# Patient Record
Sex: FEMALE | Race: WHITE
[De-identification: names, ages, dates, MRNs, and addresses within clinical notes are randomized per-mention and may not be internally consistent; named-entity substitution may affect disease eponyms.]

---

## 2018-04-23 ENCOUNTER — HOSPITAL ENCOUNTER (INPATIENT)
Dept: HOSPITAL 38 - CC.MS | Age: 83
LOS: 4 days | Discharge: NURSING, REHAB OR LONG-TERM CARE | DRG: 552 | End: 2018-04-27
Attending: FAMILY MEDICINE | Admitting: FAMILY MEDICINE
Payer: MEDICARE

## 2018-04-23 DIAGNOSIS — I25.10: ICD-10-CM

## 2018-04-23 DIAGNOSIS — Z79.82: ICD-10-CM

## 2018-04-23 DIAGNOSIS — S32.039A: ICD-10-CM

## 2018-04-23 DIAGNOSIS — Z79.01: ICD-10-CM

## 2018-04-23 DIAGNOSIS — Z88.8: ICD-10-CM

## 2018-04-23 DIAGNOSIS — S22.070A: ICD-10-CM

## 2018-04-23 DIAGNOSIS — M81.0: ICD-10-CM

## 2018-04-23 DIAGNOSIS — E78.5: ICD-10-CM

## 2018-04-23 DIAGNOSIS — Z96.659: ICD-10-CM

## 2018-04-23 DIAGNOSIS — I10: ICD-10-CM

## 2018-04-23 DIAGNOSIS — S22.080A: ICD-10-CM

## 2018-04-23 DIAGNOSIS — N32.81: ICD-10-CM

## 2018-04-23 DIAGNOSIS — Z79.02: ICD-10-CM

## 2018-04-23 DIAGNOSIS — M54.9: ICD-10-CM

## 2018-04-23 DIAGNOSIS — K21.9: ICD-10-CM

## 2018-04-23 DIAGNOSIS — D47.2: ICD-10-CM

## 2018-04-23 DIAGNOSIS — S32.030A: ICD-10-CM

## 2018-04-23 DIAGNOSIS — Z79.899: ICD-10-CM

## 2018-04-23 DIAGNOSIS — M15.9: ICD-10-CM
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
## 2018-04-23 DIAGNOSIS — E03.9: ICD-10-CM

## 2018-04-23 DIAGNOSIS — S32.020A: ICD-10-CM

## 2018-04-23 DIAGNOSIS — S32.029A: Primary | ICD-10-CM

## 2018-04-23 LAB
CHLORIDE SERPL-SCNC: 98 MEQ/L (ref 98–106)
SODIUM SERPL-SCNC: 138 MEQ/L (ref 136–145)

## 2018-04-23 PROCEDURE — G0378 HOSPITAL OBSERVATION PER HR: HCPCS

## 2018-04-23 PROCEDURE — C9113 INJ PANTOPRAZOLE SODIUM, VIA: HCPCS

## 2018-04-23 RX ADMIN — HYDROCODONE BITATRATE AND ACETAMINOPHEN PRN TAB: 5; 325 TABLET ORAL at 21:13

## 2018-04-24 RX ADMIN — FENTANYL CITRATE SCH MCG: 50 INJECTION, SOLUTION INTRAMUSCULAR; INTRAVENOUS at 11:50

## 2018-04-24 RX ADMIN — HYDROCODONE BITATRATE AND ACETAMINOPHEN PRN TAB: 5; 325 TABLET ORAL at 20:25

## 2018-04-24 RX ADMIN — HYDROCODONE BITATRATE AND ACETAMINOPHEN PRN TAB: 5; 325 TABLET ORAL at 14:08

## 2018-04-24 RX ADMIN — FENTANYL CITRATE SCH MCG: 50 INJECTION, SOLUTION INTRAMUSCULAR; INTRAVENOUS at 18:06

## 2018-04-24 RX ADMIN — HYDROCODONE BITATRATE AND ACETAMINOPHEN PRN TAB: 5; 325 TABLET ORAL at 08:58

## 2018-04-24 NOTE — PCM.PN
- General Info


Date of Service: 04/24/18


Admission Dx/Problem (Free Text): 





Back Pain due to compression fracture of L2 vertebrae


Functional Status: Reports: Pain Controlled, Tolerating Diet.  Denies: 

Ambulating





- Review of Systems


General: Reports: Weakness, Fatigue


HEENT: Reports: No Symptoms


Pulmonary: Reports: No Symptoms


Cardiovascular: Reports: No Symptoms


Gastrointestinal: Reports: No Symptoms


Musculoskeletal: Reports: Back Pain


Skin: Reports: No Symptoms


Neurological: Reports: Weakness


Psychiatric: Reports: No Symptoms





- Patient Data


Vitals - Most Recent: 


 Last Vital Signs











Temp  97.4 F   04/24/18 20:00


 


Pulse  78   04/24/18 20:00


 


Resp  16   04/24/18 20:00


 


BP  129/54 L  04/24/18 20:00


 


Pulse Ox  95   04/24/18 20:00











Weight - Most Recent: 226 lb


Med Orders - Current: 


 Current Medications





Hydrocodone Bitart/Acetaminophen (Norco 325-5 Mg)  1 - 2 tab PO Q4H PRN


   PRN Reason: Pain (moderate 4-6)


   Last Admin: 04/24/18 20:25 Dose:  2 tab


Clopidogrel Bisulfate (Plavix)  75 mg PO DAILY [facility]


   Last Admin: 04/24/18 10:12 Dose:  75 mg


Cyclobenzaprine HCl (Flexeril)  5 mg PO BEDTIME PRN


   PRN Reason: Muscle Spasm


Enoxaparin Sodium (Lovenox)  30 mg SUBCUT Q24H [facility]


   Last Admin: 04/24/18 14:07 Dose:  30 mg


Fentanyl (Sublimaze)  50 mcg IVPUSH Q8H [facility]


   Last Admin: 04/24/18 18:06 Dose:  50 mcg


Fentanyl (Sublimaze)  25 - 50 mcg IVPUSH Q6H PRN


   PRN Reason: Pain


Furosemide (Lasix)  40 mg PO DAILY [facility]


   Last Admin: 04/24/18 10:13 Dose:  40 mg


Isosorbide Mononitrate (Imdur)  30 mg PO DAILY [facility]


   Last Admin: 04/24/18 10:12 Dose:  30 mg


Lisinopril (Prinivil)  20 mg PO DAILY [facility]


   Last Admin: 04/24/18 10:11 Dose:  20 mg


Metoprolol Tartrate (Lopressor)  25 mg PO DAILY [facility]


   Last Admin: 04/24/18 10:13 Dose:  25 mg


Ondansetron HCl (Zofran Odt)  4 mg PO Q4H PRN


   PRN Reason: nausea, able to take PO


Ondansetron HCl (Zofran)  8 mg IV Q6H PRN


   PRN Reason: Nausea/Vomiting


Oxybutynin Chloride (Oxybutynin)  5 mg PO TID [facility]


   Last Admin: 04/24/18 20:21 Dose:  5 mg


Pantoprazole Sodium (Protonix***)  40 mg PO DAILY [facility]


   Last Admin: 04/24/18 10:13 Dose:  40 mg


Pantoprazole Sodium (Protonix Iv***)  40 mg IVPUSH DAILY [facility]


   Last Admin: 04/24/18 10:22 Dose:  40 mg


Simvastatin (Zocor)  40 mg PO DAILY [facility]


   Last Admin: 04/24/18 10:12 Dose:  40 mg


Sodium Chloride (Saline Flush)  10 ml FLUSH ASDIRECTED PRN


   PRN Reason: Keep Vein Open


Temazepam (Restoril)  15 mg PO BEDTIME PRN


   PRN Reason: Sleep





Discontinued Medications





Hydrocodone Bitart/Acetaminophen (Norco 325-5 Mg)  1 tab PO Q4H PRN


   PRN Reason: Pain (moderate 4-6)


   Last Admin: 04/24/18 08:58 Dose:  1 tab


Diazepam (Valium.)  5 mg PO ONETIME ONE


   Stop: 04/24/18 06:31


   Last Admin: 04/24/18 06:39 Dose:  5 mg


Fentanyl (Sublimaze)  25 mcg IVPUSH Q6H PRN


   PRN Reason: Breakthrough Pain


   Last Admin: 04/24/18 07:35 Dose:  25 mcg


Fentanyl (Sublimaze)  25 - 50 mcg IVPUSH Q4H PRN


   PRN Reason: Pain


Fentanyl (Sublimaze)  50 mcg IVPUSH ONETIME ONE


   Stop: 04/24/18 15:11


   Last Admin: 04/24/18 15:12 Dose:  50 mcg


Ketorolac Tromethamine (Toradol)  30 mg IVPUSH ONETIME ONE


   Stop: 04/24/18 09:42


   Last Admin: 04/24/18 10:18 Dose:  30 mg


Lorazepam (Ativan)  1 mg IVPUSH ONETIME ONE


   Stop: 04/24/18 15:11


   Last Admin: 04/24/18 15:16 Dose:  1 mg











- Exam


General: Alert, Oriented, Moderate Distress


HEENT: Mucous Membr. Moist/Pink


Neck: Supple


Lungs: Clear to Auscultation, Normal Respiratory Effort


Cardiovascular: Regular Rate, Regular Rhythm


GI/Abdominal Exam: Normal Bowel Sounds, Soft, Non-Tender


Back Exam: Decreased Range of Motion, Vertebral Tenderness


Extremities: Normal Inspection, No Pedal Edema


Skin: Warm, Dry


Neurological: No New Focal Deficit





- Problem List & Annotations


(1) Compression fracture of L2 lumbar vertebra


SNOMED Code(s): 32059150922447569


   Code(s): S32.020A - WEDGE COMPRESSION FRACTURE OF SECOND LUMBAR VERTEBRA, 

INIT   Status: Acute   Priority: High   Current Visit: Yes   


Qualifiers: 


   Encounter type: initial encounter 





- Problem List Review


Problem List Initiated/Reviewed/Updated: Yes





- My Orders


Last 24 Hours: 


My Active Orders





04/24/18 09:10


Acetaminophen/HYDROcodone [Norco 325-5 MG]   1 - 2 tab PO Q4H PRN 





04/24/18 09:41


Cyclobenzaprine [Flexeril]   5 mg PO BEDTIME PRN 





04/24/18 09:45


Pantoprazole [ProTONIX IV***]   40 mg IVPUSH DAILY 


fentaNYL [Sublimaze]   50 mcg IVPUSH Q8H 





04/24/18 10:17


fentaNYL [Sublimaze]   25 - 50 mcg IVPUSH Q6H PRN 














- Assessment


Assessment:: 





L2 compression fracture





- Plan


Plan:: 





Patient continues to have ongoing back pain.  Does not feel the meds provide 

her with long term benefits.  Does well at rest, pain increases with movement.  

Not transferring well.  Was incontinent of urine this am.  Did attempt to do 

MRI this am but patient was unable to tolerate even though premedicated with 

Fentanyl and Ativan.  Does not feel she could lie for a CT scan either.  Is 

aware that further plan for possible vertebroplasty can not be done without the 

MRI/CT scan.  





Will attempt to repeat MRI later this afternoon.  Changed Fentanyl to scheduled 

every 8 hours; transferred to inpatient status for pain control.  Advised she 

could request additional pain meds between if needed.  Start physical therapy.  

Inappropriate for discharge.

## 2018-04-25 RX ADMIN — HYDROCODONE BITATRATE AND ACETAMINOPHEN PRN TAB: 5; 325 TABLET ORAL at 08:30

## 2018-04-25 RX ADMIN — HYDROCODONE BITATRATE AND ACETAMINOPHEN PRN TAB: 5; 325 TABLET ORAL at 20:24

## 2018-04-25 RX ADMIN — FENTANYL CITRATE SCH MCG: 50 INJECTION, SOLUTION INTRAMUSCULAR; INTRAVENOUS at 17:52

## 2018-04-25 RX ADMIN — FENTANYL CITRATE SCH: 50 INJECTION, SOLUTION INTRAMUSCULAR; INTRAVENOUS at 02:57

## 2018-04-25 RX ADMIN — HYDROCODONE BITATRATE AND ACETAMINOPHEN PRN TAB: 5; 325 TABLET ORAL at 15:03

## 2018-04-25 RX ADMIN — FENTANYL CITRATE SCH MCG: 50 INJECTION, SOLUTION INTRAMUSCULAR; INTRAVENOUS at 09:33

## 2018-04-25 NOTE — PCM.PN
- General Info


Date of Service: 04/25/18


Admission Dx/Problem (Free Text): 





Back Pain due to compression fracture of L2 vertebrae


Functional Status: Reports: Tolerating Diet.  Denies: Pain Controlled, 

Ambulating





- Review of Systems


General: Reports: Weakness.  Denies: Fever, Fatigue, Malaise


HEENT: Reports: No Symptoms


Pulmonary: Denies: Shortness of Breath, Cough, Sputum


Cardiovascular: Denies: Chest Pain, Edema, Lightheadedness


Gastrointestinal: Denies: Abdominal Pain, Nausea, Vomiting


Genitourinary: Reports: No Symptoms


Musculoskeletal: Reports: Back Pain


Skin: Reports: No Symptoms


Neurological: Reports: Weakness


Psychiatric: Reports: No Symptoms





- Patient Data


Vitals - Most Recent: 


 Last Vital Signs











Temp  97.6 F   04/25/18 12:00


 


Pulse  75   04/25/18 12:00


 


Resp  20   04/25/18 12:00


 


BP  132/61   04/25/18 12:00


 


Pulse Ox  95   04/25/18 12:00











Weight - Most Recent: 226 lb


Med Orders - Current: 


 Current Medications





Hydrocodone Bitart/Acetaminophen (Norco 325-5 Mg)  1 - 2 tab PO Q4H PRN


   PRN Reason: Pain (moderate 4-6)


   Last Admin: 04/25/18 15:03 Dose:  2 tab


Cyclobenzaprine HCl (Flexeril)  5 mg PO BEDTIME PRN


   PRN Reason: Muscle Spasm


   Last Admin: 04/25/18 12:06 Dose:  5 mg


Enoxaparin Sodium (Lovenox)  30 mg SUBCUT Q24H [facility]


   Last Admin: 04/25/18 12:06 Dose:  30 mg


Fentanyl (Sublimaze)  50 mcg IVPUSH Q8H [facility]


   Last Admin: 04/25/18 09:33 Dose:  50 mcg


Fentanyl (Sublimaze)  25 - 50 mcg IVPUSH Q6H PRN


   PRN Reason: Pain


Furosemide (Lasix)  40 mg PO DAILY [facility]


   Last Admin: 04/25/18 08:25 Dose:  40 mg


Isosorbide Mononitrate (Imdur)  30 mg PO DAILY [facility]


   Last Admin: 04/25/18 08:23 Dose:  30 mg


Lisinopril (Prinivil)  20 mg PO DAILY [facility]


   Last Admin: 04/25/18 08:24 Dose:  20 mg


Metoprolol Tartrate (Lopressor)  25 mg PO DAILY [facility]


   Last Admin: 04/25/18 08:24 Dose:  25 mg


Ondansetron HCl (Zofran Odt)  4 mg PO Q4H PRN


   PRN Reason: nausea, able to take PO


Ondansetron HCl (Zofran)  8 mg IV Q6H PRN


   PRN Reason: Nausea/Vomiting


Oxybutynin Chloride (Oxybutynin)  5 mg PO TID [facility]


   Last Admin: 04/25/18 15:03 Dose:  5 mg


Pantoprazole Sodium (Protonix***)  40 mg PO DAILY [facility]


   Last Admin: 04/25/18 08:25 Dose:  40 mg


Simvastatin (Zocor)  40 mg PO DAILY [facility]


   Last Admin: 04/25/18 08:23 Dose:  40 mg


Sodium Chloride (Saline Flush)  10 ml FLUSH ASDIRECTED PRN


   PRN Reason: Keep Vein Open


Temazepam (Restoril)  15 mg PO BEDTIME PRN


   PRN Reason: Sleep





Discontinued Medications





Hydrocodone Bitart/Acetaminophen (Norco 325-5 Mg)  1 tab PO Q4H PRN


   PRN Reason: Pain (moderate 4-6)


   Last Admin: 04/24/18 08:58 Dose:  1 tab


Clopidogrel Bisulfate (Plavix)  75 mg PO DAILY [facility]


   Last Admin: 04/25/18 08:24 Dose:  75 mg


Diazepam (Valium.)  5 mg PO ONETIME ONE


   Stop: 04/24/18 06:31


   Last Admin: 04/24/18 06:39 Dose:  5 mg


Fentanyl (Sublimaze)  25 mcg IVPUSH Q6H PRN


   PRN Reason: Breakthrough Pain


   Last Admin: 04/24/18 07:35 Dose:  25 mcg


Fentanyl (Sublimaze)  25 - 50 mcg IVPUSH Q4H PRN


   PRN Reason: Pain


Fentanyl (Sublimaze)  50 mcg IVPUSH ONETIME ONE


   Stop: 04/24/18 15:11


   Last Admin: 04/24/18 15:12 Dose:  50 mcg


Ketorolac Tromethamine (Toradol)  30 mg IVPUSH ONETIME ONE


   Stop: 04/24/18 09:42


   Last Admin: 04/24/18 10:18 Dose:  30 mg


Ketorolac Tromethamine (Toradol) Confirm Administered Dose 30 mg .ROUTE .STK-

MED ONE


   Stop: 04/24/18 10:29


   Last Admin: 04/25/18 10:52 Dose:  Not Given


Lorazepam (Ativan)  1 mg IVPUSH ONETIME ONE


   Stop: 04/24/18 15:11


   Last Admin: 04/24/18 15:16 Dose:  1 mg


Lorazepam (Ativan) Confirm Administered Dose 2 mg .ROUTE .STK-MED ONE


   Stop: 04/24/18 15:27


   Last Admin: 04/25/18 10:52 Dose:  Not Given


Methylprednisolone Sodium Succinate (Solu-Medrol) Confirm Administered Dose 125 

mg .ROUTE .STK-MED ONE


   Stop: 04/24/18 10:19


   Last Admin: 04/25/18 10:52 Dose:  Not Given


Pantoprazole Sodium (Protonix Iv***)  40 mg IVPUSH DAILY [facility]


   Last Admin: 04/25/18 09:21 Dose:  Not Given


Pantoprazole Sodium (Protonix Iv***) Confirm Administered Dose 40 mg .ROUTE .STK

-MED ONE


   Stop: 04/24/18 10:29


   Last Admin: 04/25/18 10:52 Dose:  Not Given











- Exam


General: Alert, Oriented


HEENT: Mucous Membr. Moist/Pink


Neck: Supple


Lungs: Clear to Auscultation, Normal Respiratory Effort


Cardiovascular: Regular Rate, Regular Rhythm


GI/Abdominal Exam: Normal Bowel Sounds, Soft, Non-Tender


Back Exam: Vertebral Tenderness (tender to lower thoracic and lumbar spine)


Extremities: Normal Inspection, No Pedal Edema


Skin: Warm, Dry


Neurological: No New Focal Deficit





- Problem List & Annotations


(1) Compression fracture of L2 lumbar vertebra


SNOMED Code(s): 41156831450575919


   Code(s): S32.020A - WEDGE COMPRESSION FRACTURE OF SECOND LUMBAR VERTEBRA, 

INIT   Status: Acute   Priority: High   Current Visit: Yes   


Qualifiers: 


   Encounter type: initial encounter 





- Problem List Review


Problem List Initiated/Reviewed/Updated: Yes





- My Orders


Last 24 Hours: 


My Active Orders





04/25/18 09:15


Lumbar Spine wo Cont [CT] Routine 


Thoracic Spine wo Cont [CT] Routine 














- Assessment


Assessment:: 





L2 compression fracture





- Plan


Plan:: 





Patient continues to have ongoing back pain.  Does not feel the meds provide 

her with long term benefits.  Does well at rest, pain increases with movement.  

Not transferring well.  Was incontinent of urine this am.  Did attempt to do 

MRI this am but patient was unable to tolerate even though premedicated with 

Fentanyl and Ativan.  Does not feel she could lie for a CT scan either.  Is 

aware that further plan for possible vertebroplasty can not be done without the 

MRI/CT scan.  





Will attempt to repeat MRI later this afternoon.  Changed Fentanyl to scheduled 

every 8 hours; transferred to inpatient status for pain control.  Advised she 

could request additional pain meds between if needed.  Start physical therapy.  

Inappropriate for discharge.  





4-


Patient continues to have complaints of ongoing pain.  Staff relates she did 

rest well through the night, actually held her pain meds as she was difficult 

to around at 1 am.  She states she is comfortable while at rest but pain is 

intense with movement.  Unable to complete MRI yesterday on the second attempt 

as the MRI truck had to be moved for life flight to land so they opted to 

leave.  





Will proceed with CT scan for the thoracic and lumbar spine.  Continue 

scheduled pain meds.  Physical therapy.  Possibly consult with Interventional 

Radiology pending the CT report.  Inappropriate for discharge due to pain level.

## 2018-04-26 RX ADMIN — FENTANYL CITRATE SCH MCG: 50 INJECTION, SOLUTION INTRAMUSCULAR; INTRAVENOUS at 08:51

## 2018-04-26 RX ADMIN — HYDROCODONE BITATRATE AND ACETAMINOPHEN PRN TAB: 5; 325 TABLET ORAL at 19:34

## 2018-04-26 RX ADMIN — FENTANYL CITRATE SCH MCG: 50 INJECTION, SOLUTION INTRAMUSCULAR; INTRAVENOUS at 17:19

## 2018-04-26 RX ADMIN — FENTANYL CITRATE SCH: 50 INJECTION, SOLUTION INTRAMUSCULAR; INTRAVENOUS at 01:17

## 2018-04-26 RX ADMIN — HYDROCODONE BITATRATE AND ACETAMINOPHEN PRN TAB: 5; 325 TABLET ORAL at 12:12

## 2018-04-26 RX ADMIN — HYDROCODONE BITATRATE AND ACETAMINOPHEN PRN TAB: 5; 325 TABLET ORAL at 07:01

## 2018-04-26 NOTE — PCM.PN
- General Info


Date of Service: 04/26/18


Admission Dx/Problem (Free Text): 





Back Pain due to compression fracture of L2 vertebrae


Functional Status: Reports: Tolerating Diet, Ambulating.  Denies: Pain 

Controlled





- Review of Systems


General: Reports: Weakness, Fatigue, Malaise.  Denies: Fever


HEENT: Reports: No Symptoms


Pulmonary: Reports: Shortness of Breath.  Denies: Cough


Cardiovascular: Reports: Chest Pain, Edema, Lightheadedness


Gastrointestinal: Denies: Abdominal Pain, Nausea, Vomiting


Musculoskeletal: Reports: Back Pain


Skin: Reports: No Symptoms


Neurological: Reports: No Symptoms





- Patient Data


Vitals - Most Recent: 


 Last Vital Signs











Temp  98.2 F   04/26/18 19:51


 


Pulse  85   04/26/18 19:51


 


Resp  16   04/26/18 19:51


 


BP  141/60 H  04/26/18 19:51


 


Pulse Ox  97   04/26/18 19:51











Weight - Most Recent: 226 lb


Med Orders - Current: 


 Current Medications





Hydrocodone Bitart/Acetaminophen (Norco 325-5 Mg)  1 - 2 tab PO Q4H PRN


   PRN Reason: Pain (moderate 4-6)


   Last Admin: 04/26/18 19:34 Dose:  2 tab


Cyclobenzaprine HCl (Flexeril)  5 mg PO Q8H PRN


   PRN Reason: Muscle Spasm


   Last Admin: 04/26/18 10:24 Dose:  5 mg


Enoxaparin Sodium (Lovenox)  30 mg SUBCUT Q24H [facility]


   Last Admin: 04/26/18 12:12 Dose:  30 mg


Fentanyl (Sublimaze)  50 mcg IVPUSH Q8H [facility]


   Last Admin: 04/26/18 17:19 Dose:  50 mcg


Fentanyl (Sublimaze)  25 - 50 mcg IVPUSH Q6H PRN


   PRN Reason: Pain


Furosemide (Lasix)  40 mg PO DAILY [facility]


   Last Admin: 04/26/18 07:03 Dose:  40 mg


Isosorbide Mononitrate (Imdur)  30 mg PO DAILY [facility]


   Last Admin: 04/26/18 07:03 Dose:  30 mg


Lisinopril (Prinivil)  20 mg PO DAILY [facility]


   Last Admin: 04/26/18 07:03 Dose:  20 mg


Metoprolol Tartrate (Lopressor)  25 mg PO DAILY [facility]


   Last Admin: 04/26/18 07:02 Dose:  25 mg


Ondansetron HCl (Zofran Odt)  4 mg PO Q4H PRN


   PRN Reason: nausea, able to take PO


Ondansetron HCl (Zofran)  8 mg IV Q6H PRN


   PRN Reason: Nausea/Vomiting


Oxybutynin Chloride (Oxybutynin)  5 mg PO TID [facility]


   Last Admin: 04/26/18 19:34 Dose:  5 mg


Pantoprazole Sodium (Protonix***)  40 mg PO DAILY [facility]


   Last Admin: 04/26/18 07:03 Dose:  40 mg


Simvastatin (Zocor)  40 mg PO DAILY [facility]


   Last Admin: 04/26/18 07:02 Dose:  40 mg


Sodium Chloride (Saline Flush)  10 ml FLUSH ASDIRECTED PRN


   PRN Reason: Keep Vein Open


Temazepam (Restoril)  15 mg PO BEDTIME PRN


   PRN Reason: Sleep





Discontinued Medications





Hydrocodone Bitart/Acetaminophen (Norco 325-5 Mg)  1 tab PO Q4H PRN


   PRN Reason: Pain (moderate 4-6)


   Last Admin: 04/24/18 08:58 Dose:  1 tab


Clopidogrel Bisulfate (Plavix)  75 mg PO DAILY [facility]


   Last Admin: 04/25/18 08:24 Dose:  75 mg


Cyclobenzaprine HCl (Flexeril)  5 mg PO BEDTIME PRN


   PRN Reason: Muscle Spasm


   Last Admin: 04/25/18 12:06 Dose:  5 mg


Diazepam (Valium.)  5 mg PO ONETIME ONE


   Stop: 04/24/18 06:31


   Last Admin: 04/24/18 06:39 Dose:  5 mg


Fentanyl (Sublimaze)  25 mcg IVPUSH Q6H PRN


   PRN Reason: Breakthrough Pain


   Last Admin: 04/24/18 07:35 Dose:  25 mcg


Fentanyl (Sublimaze)  25 - 50 mcg IVPUSH Q4H PRN


   PRN Reason: Pain


Fentanyl (Sublimaze)  50 mcg IVPUSH ONETIME ONE


   Stop: 04/24/18 15:11


   Last Admin: 04/24/18 15:12 Dose:  50 mcg


Ketorolac Tromethamine (Toradol)  30 mg IVPUSH ONETIME ONE


   Stop: 04/24/18 09:42


   Last Admin: 04/24/18 10:18 Dose:  30 mg


Ketorolac Tromethamine (Toradol) Confirm Administered Dose 30 mg .ROUTE .STK-

MED ONE


   Stop: 04/24/18 10:29


   Last Admin: 04/25/18 10:52 Dose:  Not Given


Lorazepam (Ativan)  1 mg IVPUSH ONETIME ONE


   Stop: 04/24/18 15:11


   Last Admin: 04/24/18 15:16 Dose:  1 mg


Lorazepam (Ativan) Confirm Administered Dose 2 mg .ROUTE .STK-MED ONE


   Stop: 04/24/18 15:27


   Last Admin: 04/25/18 10:52 Dose:  Not Given


Methylprednisolone Sodium Succinate (Solu-Medrol) Confirm Administered Dose 125 

mg .ROUTE .STK-MED ONE


   Stop: 04/24/18 10:19


   Last Admin: 04/25/18 10:52 Dose:  Not Given


Pantoprazole Sodium (Protonix Iv***)  40 mg IVPUSH DAILY [facility]


   Last Admin: 04/25/18 09:21 Dose:  Not Given


Pantoprazole Sodium (Protonix Iv***) Confirm Administered Dose 40 mg .ROUTE .STK

-MED ONE


   Stop: 04/24/18 10:29


   Last Admin: 04/25/18 10:52 Dose:  Not Given











- Exam


General: Alert, Oriented


HEENT: Mucous Membr. Moist/Pink


Neck: Supple


Lungs: Clear to Auscultation, Normal Respiratory Effort


Cardiovascular: Regular Rate, Regular Rhythm


Back Exam: Decreased Range of Motion, Vertebral Tenderness


Skin: Warm, Dry


Neurological: No New Focal Deficit





- Problem List & Annotations


(1) Compression fracture of L2 lumbar vertebra


SNOMED Code(s): 27099978689225464


   Code(s): S32.020A - WEDGE COMPRESSION FRACTURE OF SECOND LUMBAR VERTEBRA, 

INIT   Status: Acute   Priority: High   


Qualifiers: 


   Encounter type: initial encounter 





(2) Thoracic compression fracture


SNOMED Code(s): 255174647, [id number]


   Code(s): S22.000A - WEDGE COMPRESSION FRACTURE OF UNSP THORACIC VERTEBRA, 

INIT   Status: Acute   Priority: High   


Qualifiers: 


   Encounter type: initial encounter 





- Problem List Review


Problem List Initiated/Reviewed/Updated: Yes





- My Orders


Last 24 Hours: 


My Active Orders





04/26/18 09:34


Cyclobenzaprine [Flexeril]   5 mg PO Q8H PRN 














- Assessment


Assessment:: 





L2 compression fracture





- Plan


Plan:: 





Patient continues to have ongoing back pain.  Does not feel the meds provide 

her with long term benefits.  Does well at rest, pain increases with movement.  

Not transferring well.  Was incontinent of urine this am.  Did attempt to do 

MRI this am but patient was unable to tolerate even though premedicated with 

Fentanyl and Ativan.  Does not feel she could lie for a CT scan either.  Is 

aware that further plan for possible vertebroplasty can not be done without the 

MRI/CT scan.  





Will attempt to repeat MRI later this afternoon.  Changed Fentanyl to scheduled 

every 8 hours; transferred to inpatient status for pain control.  Advised she 

could request additional pain meds between if needed.  Start physical therapy.  

Inappropriate for discharge.  





4-


Patient continues to have complaints of ongoing pain.  Staff relates she did 

rest well through the night, actually held her pain meds as she was difficult 

to around at 1 am.  She states she is comfortable while at rest but pain is 

intense with movement.  Unable to complete MRI yesterday on the second attempt 

as the MRI truck had to be moved for life flight to land so they opted to 

leave.  





Will proceed with CT scan for the thoracic and lumbar spine.  Continue 

scheduled pain meds.  Physical therapy.  Possibly consult with Interventional 

Radiology pending the CT report.  Inappropriate for discharge due to pain level.





4-


Patient has ongoing pain in her back.  Despite pain meds, has discomfort with 

movement.  CT results do show compression fractures to T10 and T12 as well as 

the lumbar one.  Did speak with Dr. Elias this am and was informed that 

difficult to determine if subacute versus acute and will ultimately need an 

MRI.  He does note that vertebroplasty will be beneficial for T12.  Plan for 

her to be completely off the Plavix and ASA for one week today.  Have him see 

her on Monday for consult.  Will determine if requires ambulance transfer and 

admission there dependent on pain level that day.  





Will continue with current pain meds, physical therapy.  Plan for transfer to 

swing bed on Friday for ongoing pain control and assistance with transfers/

ADLs.  Family all informed.

## 2018-04-27 ENCOUNTER — HOSPITAL ENCOUNTER (INPATIENT)
Dept: HOSPITAL 38 - CC.MS | Age: 83
LOS: 3 days | Discharge: HOME | DRG: 561 | End: 2018-04-30
Attending: FAMILY MEDICINE | Admitting: FAMILY MEDICINE
Payer: MEDICARE

## 2018-04-27 VITALS — SYSTOLIC BLOOD PRESSURE: 177 MMHG | DIASTOLIC BLOOD PRESSURE: 81 MMHG

## 2018-04-27 DIAGNOSIS — Z79.01: ICD-10-CM

## 2018-04-27 DIAGNOSIS — S32.030D: ICD-10-CM

## 2018-04-27 DIAGNOSIS — S32.020D: Primary | ICD-10-CM

## 2018-04-27 DIAGNOSIS — Z88.8: ICD-10-CM

## 2018-04-27 DIAGNOSIS — Z79.02: ICD-10-CM

## 2018-04-27 DIAGNOSIS — D47.2: ICD-10-CM

## 2018-04-27 DIAGNOSIS — I10: ICD-10-CM

## 2018-04-27 DIAGNOSIS — Z79.82: ICD-10-CM

## 2018-04-27 DIAGNOSIS — Z79.899: ICD-10-CM

## 2018-04-27 DIAGNOSIS — E78.5: ICD-10-CM
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
## 2018-04-27 DIAGNOSIS — I25.10: ICD-10-CM

## 2018-04-27 DIAGNOSIS — S22.080D: ICD-10-CM

## 2018-04-27 DIAGNOSIS — M81.0: ICD-10-CM

## 2018-04-27 DIAGNOSIS — S22.070D: ICD-10-CM

## 2018-04-27 DIAGNOSIS — N32.81: ICD-10-CM

## 2018-04-27 DIAGNOSIS — Z96.659: ICD-10-CM

## 2018-04-27 DIAGNOSIS — M15.9: ICD-10-CM

## 2018-04-27 DIAGNOSIS — K21.9: ICD-10-CM

## 2018-04-27 DIAGNOSIS — E03.9: ICD-10-CM

## 2018-04-27 RX ADMIN — POTASSIUM CHLORIDE SCH MEQ: 750 TABLET, FILM COATED, EXTENDED RELEASE ORAL at 13:25

## 2018-04-27 RX ADMIN — FENTANYL CITRATE SCH: 50 INJECTION, SOLUTION INTRAMUSCULAR; INTRAVENOUS at 01:14

## 2018-04-27 RX ADMIN — FENTANYL CITRATE SCH MCG: 50 INJECTION, SOLUTION INTRAMUSCULAR; INTRAVENOUS at 23:44

## 2018-04-27 RX ADMIN — FENTANYL CITRATE SCH MCG: 50 INJECTION, SOLUTION INTRAMUSCULAR; INTRAVENOUS at 15:35

## 2018-04-27 RX ADMIN — HYDROCODONE BITATRATE AND ACETAMINOPHEN PRN TAB: 5; 325 TABLET ORAL at 08:29

## 2018-04-27 RX ADMIN — HYDROCODONE BITATRATE AND ACETAMINOPHEN PRN TAB: 5; 325 TABLET ORAL at 17:55

## 2018-04-27 RX ADMIN — FENTANYL CITRATE SCH MCG: 50 INJECTION, SOLUTION INTRAMUSCULAR; INTRAVENOUS at 09:39

## 2018-04-27 NOTE — PCM.DCSUM1
**Discharge Summary





- Hospital Course


Free Text/Narrative:: 





Patient admitted by Dr. Reid from clinic for ongoing low back pain.  She had a 

previous fall several weeks ago, had been using pain medications and seeing PT 

but continues to get worse.  States any type of activity is difficult due to 

pain.  Has been noting muscle spasms with this.  She had xrays initially after 

the fall with a notable lumbar compression fracture.  Had felt she could manage 

at home but found that to be quite difficult.  Admitted for pain control.  MRI 

ordered.





- Discharge Data


Discharge Date: 04/27/18


Discharge Disposition: DC/Tfer W/I Hosp To [facility]


Condition: Fair





- Discharge Diagnosis/Problem(s)


(1) Compression fracture of L2 lumbar vertebra


SNOMED Code(s): 11173716897493731


   ICD Code: S32.020A - WEDGE COMPRESSION FRACTURE OF SECOND LUMBAR VERTEBRA, 

INIT   Status: Acute   Priority: High   


Qualifiers: 


   Encounter type: initial encounter 





(2) Thoracic compression fracture


SNOMED Code(s): 255552722, [id number]


   ICD Code: S22.000A - WEDGE COMPRESSION FRACTURE OF UNSP THORACIC VERTEBRA, 

INIT   Status: Acute   Priority: High   


Qualifiers: 


   Encounter type: initial encounter 





- Patient Summary/Data


Complications: none


Consults: 


 Consultations





04/23/18 12:53


PT Evaluation and Treatment [CONS] Routine 











Hospital Course: 





Patient has continued to have ongoing back pain despite receiving frequent 

medications. Does well at rest but has significant discomfort with transfers.  

Has been on scheduled IV Fentanyl and Norco PRN.  Physical therapy working with 

patient.  She was unable to complete the MRI on Tuesday as she could not 

tolerate lying flat.  CT ultimately was done on Wednesday with more 

premedication.  She does have 2 compression fractures in her thoracic spine in 

T10 and T12 as well with loss of height.  Dr. Goodman of interventional 

radiology was consulted.  Does recommend proceeding with vertebroplasty but may 

need to do MRI under general anesthesia to determine acuteness of the 

fractures.  Will be off her Coumadin and Plavix for a week on Monday so will 

contact him with transfer and determine if able to be seen as an outpatient and 

that time or be admitted by them for the procedure.  











- Patient Instructions


Diet: Usual Diet as Tolerated


Activity: As Tolerated





- Discharge Plan


Home Medications: 


 Home Meds





Furosemide 1 tab PO DAILY 06/28/15 [History]


Isosorbide Mononitrate [Imdur] 1 tab PO DAILY 06/28/15 [History]


Lisinopril 20 mg PO DAILY 06/28/15 [History]


Metoprolol Tartrate [Lopressor] 1 tab PO DAILY 06/28/15 [History]


Omeprazole 1 tab PO DAILY 06/28/15 [History]


Oxybutynin 5 mg PO TID 06/28/15 [History]


atorvaSTATin [Lipitor] 1 tab PO DAILY 06/28/15 [History]


traMADol HCl [Tramadol HCl] 2 tab PO BID 06/28/15 [History]


Ascorbate Calcium [Vitamin C] 500 mg PO DAILY 04/23/18 [History]


Aspirin [Halfprin] 81 mg PO DAILY 04/23/18 [History]


Calcium Carbonate/Vitamin D3 [Calcium 600 + Vit D 400 Tablet] 1 each PO DAILY 04 /23/18 [History]


Cholecalciferol (Vitamin D3) [Vitamin D3] 1,000 units PO DAILY 04/23/18 [History

]


Gluc/Laurent-Msm#2/C/D3/Jacky/Born [Glucosamin-Chondroitin-MSM] 1 each PO BID 04/23/ 18 [History]


Hydrocodone/Acetaminophen [Hydrocodon-Acetaminophen 5-325] 5 - 325 mg PO 

ASDIRECTED PRN 04/23/18 [History]


Levothyroxine Sodium [Levoxyl] 50 mcg PO DAILY 04/23/18 [History]


Lutein 20 mg PO DAILY 04/23/18 [History]


Multivitamins [Tab-A-Faisal] 1 each PO DAILY 04/23/18 [History]


Potassium 1 tab PO DAILY 04/23/18 [History]








Patient Handouts:  Spinal Compression Fracture





- Discharge Summary/Plan Comment


DC Time >30 min.: Yes


Discharge Summary/Plan Comment: 





Transfer to swing bed for ongoing pain control.  Plan for more definitive care 

of fractures next week with Dr. Elias.  


Continue physical therapy and scheduled IV pain meds.  Will start Miralax and 

stool softeners.





Time with patient and family 15 minutes


Time for orders 10 minutes


Time for documentation 10 minutes.





- General Info


Date of Service: 04/27/18


Admission Dx/Problem (Free Text: 





Back Pain due to compression fracture of L2 vertebrae


Functional Status: Reports: Tolerating Diet.  Denies: Pain Controlled, 

Ambulating





- Review of Systems


General: Reports: Weakness, Fatigue


HEENT: Reports: No Symptoms


Pulmonary: Reports: No Symptoms


Cardiovascular: Reports: No Symptoms


Gastrointestinal: Reports: Constipation


Genitourinary: Reports: No Symptoms


Musculoskeletal: Reports: Back Pain


Skin: Reports: No Symptoms


Neurological: Reports: No Symptoms





- Patient Data


Vitals - Most Recent: 


 Last Vital Signs











Temp  98.0 F   04/27/18 07:28


 


Pulse  93   04/27/18 08:20


 


Resp  16   04/27/18 07:28


 


BP  177/81 H  04/27/18 08:21


 


Pulse Ox  97   04/27/18 07:28











Weight - Most Recent: 226 lb


Med Orders - Current: 


 Current Medications








Discontinued Medications





Hydrocodone Bitart/Acetaminophen (Norco 325-5 Mg)  1 tab PO Q4H PRN


   PRN Reason: Pain (moderate 4-6)


   Last Admin: 04/24/18 08:58 Dose:  1 tab


Hydrocodone Bitart/Acetaminophen (Norco 325-5 Mg)  1 - 2 tab PO Q4H PRN


   PRN Reason: Pain (moderate 4-6)


   Last Admin: 04/27/18 08:29 Dose:  2 tab


Clopidogrel Bisulfate (Plavix)  75 mg PO DAILY [facility]


   Last Admin: 04/25/18 08:24 Dose:  75 mg


Cyclobenzaprine HCl (Flexeril)  5 mg PO BEDTIME PRN


   PRN Reason: Muscle Spasm


   Last Admin: 04/25/18 12:06 Dose:  5 mg


Cyclobenzaprine HCl (Flexeril)  5 mg PO Q8H PRN


   PRN Reason: Muscle Spasm


   Last Admin: 04/26/18 10:24 Dose:  5 mg


Diazepam (Valium.)  5 mg PO ONETIME ONE


   Stop: 04/24/18 06:31


   Last Admin: 04/24/18 06:39 Dose:  5 mg


Enoxaparin Sodium (Lovenox)  30 mg SUBCUT Q24H [facility]


   Last Admin: 04/26/18 12:12 Dose:  30 mg


Fentanyl (Sublimaze)  25 mcg IVPUSH Q6H PRN


   PRN Reason: Breakthrough Pain


   Last Admin: 04/24/18 07:35 Dose:  25 mcg


Fentanyl (Sublimaze)  50 mcg IVPUSH Q8H [facility]


   Last Admin: 04/27/18 09:39 Dose:  50 mcg


Fentanyl (Sublimaze)  25 - 50 mcg IVPUSH Q4H PRN


   PRN Reason: Pain


Fentanyl (Sublimaze)  25 - 50 mcg IVPUSH Q6H PRN


   PRN Reason: Pain


   Last Admin: 04/27/18 04:20 Dose:  50 mcg


Fentanyl (Sublimaze)  50 mcg IVPUSH ONETIME ONE


   Stop: 04/24/18 15:11


   Last Admin: 04/24/18 15:12 Dose:  50 mcg


Furosemide (Lasix)  40 mg PO DAILY [facility]


   Last Admin: 04/27/18 08:20 Dose:  40 mg


Isosorbide Mononitrate (Imdur)  30 mg PO DAILY [facility]


   Last Admin: 04/27/18 08:20 Dose:  30 mg


Ketorolac Tromethamine (Toradol)  30 mg IVPUSH ONETIME ONE


   Stop: 04/24/18 09:42


   Last Admin: 04/24/18 10:18 Dose:  30 mg


Ketorolac Tromethamine (Toradol) Confirm Administered Dose 30 mg .ROUTE .STK-

MED ONE


   Stop: 04/24/18 10:29


   Last Admin: 04/25/18 10:52 Dose:  Not Given


Lisinopril (Prinivil)  20 mg PO DAILY [facility]


   Last Admin: 04/27/18 08:21 Dose:  20 mg


Lorazepam (Ativan)  1 mg IVPUSH ONETIME ONE


   Stop: 04/24/18 15:11


   Last Admin: 04/24/18 15:16 Dose:  1 mg


Lorazepam (Ativan) Confirm Administered Dose 2 mg .ROUTE .STK-MED ONE


   Stop: 04/24/18 15:27


   Last Admin: 04/25/18 10:52 Dose:  Not Given


Methylprednisolone Sodium Succinate (Solu-Medrol) Confirm Administered Dose 125 

mg .ROUTE .STK-MED ONE


   Stop: 04/24/18 10:19


   Last Admin: 04/25/18 10:52 Dose:  Not Given


Metoprolol Tartrate (Lopressor)  25 mg PO DAILY [facility]


   Last Admin: 04/27/18 08:20 Dose:  25 mg


Ondansetron HCl (Zofran Odt)  4 mg PO Q4H PRN


   PRN Reason: nausea, able to take PO


Ondansetron HCl (Zofran)  8 mg IV Q6H PRN


   PRN Reason: Nausea/Vomiting


Oxybutynin Chloride (Oxybutynin)  5 mg PO TID [facility]


   Last Admin: 04/27/18 08:21 Dose:  5 mg


Pantoprazole Sodium (Protonix***)  40 mg PO DAILY [facility]


   Last Admin: 04/27/18 08:21 Dose:  40 mg


Pantoprazole Sodium (Protonix Iv***)  40 mg IVPUSH DAILY [facility]


   Last Admin: 04/25/18 09:21 Dose:  Not Given


Pantoprazole Sodium (Protonix Iv***) Confirm Administered Dose 40 mg .ROUTE .STK

-MED ONE


   Stop: 04/24/18 10:29


   Last Admin: 04/25/18 10:52 Dose:  Not Given


Simvastatin (Zocor)  40 mg PO DAILY [facility]


   Last Admin: 04/27/18 08:22 Dose:  40 mg


Sodium Chloride (Saline Flush)  10 ml FLUSH ASDIRECTED PRN


   PRN Reason: Keep Vein Open


Temazepam (Restoril)  15 mg PO BEDTIME PRN


   PRN Reason: Sleep











- Exam


General: Reports: Alert, Oriented


HEENT: Reports: Mucous Membr. Moist/Pink


Neck: Reports: Supple


Lungs: Reports: Clear to Auscultation, Normal Respiratory Effort


Cardiovascular: Reports: Regular Rate, Regular Rhythm


GI/Abdominal Exam: Normal Bowel Sounds, Soft, Non-Tender


Back Exam: Reports: Normal Inspection, Decreased Range of Motion, Muscle Spasm, 

Vertebral Tenderness


Extremities: Normal Inspection


Skin: Reports: Warm, Dry


Neurological: Reports: No New Focal Deficit

## 2018-04-28 RX ADMIN — FENTANYL CITRATE SCH MCG: 50 INJECTION, SOLUTION INTRAMUSCULAR; INTRAVENOUS at 17:47

## 2018-04-28 RX ADMIN — FENTANYL CITRATE SCH MCG: 50 INJECTION, SOLUTION INTRAMUSCULAR; INTRAVENOUS at 07:39

## 2018-04-28 RX ADMIN — HYDROCODONE BITATRATE AND ACETAMINOPHEN PRN TAB: 5; 325 TABLET ORAL at 05:01

## 2018-04-28 RX ADMIN — POTASSIUM CHLORIDE SCH MEQ: 750 TABLET, FILM COATED, EXTENDED RELEASE ORAL at 07:37

## 2018-04-28 RX ADMIN — HYDROCODONE BITATRATE AND ACETAMINOPHEN PRN TAB: 5; 325 TABLET ORAL at 14:02

## 2018-04-29 RX ADMIN — HYDROCODONE BITATRATE AND ACETAMINOPHEN PRN TAB: 5; 325 TABLET ORAL at 13:48

## 2018-04-29 RX ADMIN — FENTANYL CITRATE SCH MCG: 50 INJECTION, SOLUTION INTRAMUSCULAR; INTRAVENOUS at 16:08

## 2018-04-29 RX ADMIN — HYDROCODONE BITATRATE AND ACETAMINOPHEN PRN TAB: 5; 325 TABLET ORAL at 19:50

## 2018-04-29 RX ADMIN — FENTANYL CITRATE SCH MCG: 50 INJECTION, SOLUTION INTRAMUSCULAR; INTRAVENOUS at 00:49

## 2018-04-29 RX ADMIN — FENTANYL CITRATE SCH MCG: 50 INJECTION, SOLUTION INTRAMUSCULAR; INTRAVENOUS at 08:16

## 2018-04-29 RX ADMIN — POTASSIUM CHLORIDE SCH MEQ: 750 TABLET, FILM COATED, EXTENDED RELEASE ORAL at 08:16

## 2018-04-30 VITALS — SYSTOLIC BLOOD PRESSURE: 153 MMHG | DIASTOLIC BLOOD PRESSURE: 71 MMHG

## 2018-04-30 RX ADMIN — FENTANYL CITRATE SCH MCG: 50 INJECTION, SOLUTION INTRAMUSCULAR; INTRAVENOUS at 08:20

## 2018-04-30 RX ADMIN — HYDROCODONE BITATRATE AND ACETAMINOPHEN PRN TAB: 5; 325 TABLET ORAL at 06:16

## 2018-04-30 RX ADMIN — POTASSIUM CHLORIDE SCH MEQ: 750 TABLET, FILM COATED, EXTENDED RELEASE ORAL at 08:10

## 2018-04-30 RX ADMIN — FENTANYL CITRATE SCH MCG: 50 INJECTION, SOLUTION INTRAMUSCULAR; INTRAVENOUS at 00:26

## 2018-05-02 NOTE — PCM.DCSUM1
**Discharge Summary





- Hospital Course


Free Text/Narrative:: 





Patient admitted from clinic with increased back pain.  Had been previously 

diagnosed with a compression fracture and was on pain medications at home.  

Pain had progressed and patient not able to transfer, sleep or ambulate well 

due to pain.  Admitted for pain control and therapy.  During acute stay, 

patient unable to lie flat for MRI despite premedication.  She continued to 

have significant discomfort.  CT ordered that does show 3 compressions fractures

, T10, T12, L1.  Had been given 2 doses of Plavix after admission.  Discussed 

report and pain with Dr. Goodman, interventional radiology.  Agrees may be 

candidate for vertebroplasty and needs to be off ASA and Plavix for the 7 days.

  Transferred to swing bed for ongoing pain control during this time frame.





- Discharge Data


Discharge Date: 04/30/18


Discharge Disposition: Home, Self-Care 01


Condition: Good





- Patient Summary/Data


Complications: none


Consults: 


 Consultations





04/27/18 10:56


PT Evaluation and Treatment [CONS] Routine 











Hospital Course: 





Patient has improved slightly over the weekend, continues to have pain but more 

cooperative with transfers to bathroom.  Has ongoing pain in the mid to low 

back.  Appetite has been good.  Discussed again with DR. Goodman this am.  Will 

discharge patient to be seen by him in clinic today in [place] and he will 

determine admission status versus having patient return for MRI under sedation 

and possible vertebroplasty.  She was given 2 pain medications this am.  





Discharge home after procedure with home health care.  Nursing to monitor pain 

status, blood pressure and ability to care for self, ie. ADLs.  Physical 

therapy for strengthening.  Is homebound due to inability to drive because of 

compression fractures, weakness and pain.  Monitor for safety regarding falls.  

Dr. Reid to oversee home health care.





- Patient Instructions


Diet: Usual Diet as Tolerated


Activity: As Tolerated





- Discharge Plan


Prescriptions/Med Rec: 


Hydrocodone/Acetaminophen [Hydrocodon-Acetaminophen 5-325] 5 - 325 mg PO 

ASDIRECTED PRN #30 tablet


 PRN Reason: Pain


Home Medications: 


 Home Meds





Furosemide 1 tab PO DAILY 06/28/15 [History]


Isosorbide Mononitrate [Imdur] 1 tab PO DAILY 06/28/15 [History]


Lisinopril 20 mg PO DAILY 06/28/15 [History]


Metoprolol Tartrate [Lopressor] 1 tab PO DAILY 06/28/15 [History]


Omeprazole 1 tab PO DAILY 06/28/15 [History]


Oxybutynin 5 mg PO TID 06/28/15 [History]


atorvaSTATin [Lipitor] 1 tab PO DAILY 06/28/15 [History]


traMADol HCl [Tramadol HCl] 2 tab PO BID 06/28/15 [History]


Ascorbate Calcium [Vitamin C] 500 mg PO DAILY 04/23/18 [History]


Aspirin [Halfprin] 81 mg PO DAILY 04/23/18 [History]


Calcium Carbonate/Vitamin D3 [Calcium 600-Vit D3 400 Tablet] 1 each PO DAILY 04/ 23/18 [History]


Cholecalciferol (Vitamin D3) [Vitamin D3] 1,000 units PO DAILY 04/23/18 [History

]


Gluc/Laurent-Msm#2/C/D3/Jacky/Born [Glucosamin-Chondroitin-MSM] 1 each PO BID 04/23/ 18 [History]


Levothyroxine Sodium [Levoxyl] 50 mcg PO DAILY 04/23/18 [History]


Lutein 20 mg PO DAILY 04/23/18 [History]


Multivitamins [Tab-A-Faisal] 1 each PO DAILY 04/23/18 [History]


Potassium 1 tab PO DAILY 04/23/18 [History]


Docusate Sodium [Colace] 100 mg PO BID  cap 04/30/18 [Rx]


Hydrocodone/Acetaminophen [Hydrocodon-Acetaminophen 5-325] 5 - 325 mg PO 

ASDIRECTED PRN #30 tablet 04/30/18 [Rx]


Polyethylene Glycol 3350 [MiraLAX] 17 gm PO DAILY  packet 04/30/18 [Rx]








Referrals: 


Chirag Goodman MD [Consulting Physician] -  (To have MRI at noon and see Dr. Goodman after at [facility] in [place] today)





- Discharge Summary/Plan Comment


DC Time >30 min.: No





- General Info


Date of Service: 04/30/18


Admission Dx/Problem (Free Text: 


T10, T12, L1 compression fractures


Functional Status: Reports: Pain Controlled (with frequent pain meds), 

Tolerating Diet.  Denies: Ambulating





- Review of Systems


General: Reports: Weakness, Fatigue.  Denies: Fever


HEENT: Reports: No Symptoms


Pulmonary: Denies: Shortness of Breath, Cough


Cardiovascular: Denies: Chest Pain, Edema


Gastrointestinal: Denies: Abdominal Pain, Nausea, Vomiting


Musculoskeletal: Reports: Back Pain


Skin: Reports: No Symptoms


Neurological: Reports: No Symptoms





- Patient Data


Vitals - Most Recent: 


 Last Vital Signs











Temp  96.9 F   04/30/18 07:24


 


Pulse  97   04/30/18 08:10


 


Resp  16   04/30/18 07:24


 


BP  153/71 H  04/30/18 08:10


 


Pulse Ox  92 L  04/30/18 07:24











Weight - Most Recent: 217 lb


Med Orders - Current: 


 Current Medications








Discontinued Medications





Hydrocodone Bitart/Acetaminophen (Norco 325-5 Mg)  1 - 2 tab PO Q4H PRN


   PRN Reason: Pain (moderate 4-6)


   Last Admin: 04/30/18 06:16 Dose:  2 tab


Cyclobenzaprine HCl (Flexeril)  5 mg PO Q8H PRN


   PRN Reason: Muscle Spasm


   Last Admin: 04/29/18 08:15 Dose:  5 mg


Docusate Sodium (Colace)  100 mg PO BID [facility]


   Last Admin: 04/30/18 08:10 Dose:  100 mg


Enoxaparin Sodium (Lovenox)  30 mg SUBCUT Q24H [facility]


   Last Admin: 04/29/18 12:11 Dose:  Not Given


Fentanyl (Sublimaze)  50 mcg IVPUSH Q8H [facility]


Fentanyl (Sublimaze)  25 - 50 mcg IVPUSH Q6H PRN


   PRN Reason: Pain


Fentanyl (Sublimaze)  50 mcg IVPUSH 0000,0800,1600 [facility]


   Last Admin: 04/30/18 08:20 Dose:  50 mcg


Furosemide (Lasix)  40 mg PO DAILY [facility]


   Last Admin: 04/30/18 08:26 Dose:  40 mg


Isosorbide Mononitrate (Imdur)  30 mg PO DAILY [facility]


   Last Admin: 04/30/18 08:10 Dose:  30 mg


Levothyroxine Sodium (Synthroid)  50 mcg PO 0700 [facility]


   Last Admin: 04/30/18 06:16 Dose:  50 mcg


Lisinopril (Prinivil)  20 mg PO DAILY [facility]


   Last Admin: 04/30/18 08:10 Dose:  20 mg


Metoprolol Tartrate (Lopressor)  25 mg PO DAILY [facility]


   Last Admin: 04/30/18 08:10 Dose:  25 mg


Ondansetron HCl (Zofran Odt)  4 mg PO Q4H PRN


   PRN Reason: nausea, able to take PO


Ondansetron HCl (Zofran)  8 mg IV Q6H PRN


   PRN Reason: Nausea/Vomiting


Oxybutynin Chloride (Oxybutynin)  5 mg PO TID [facility]


   Last Admin: 04/30/18 08:10 Dose:  5 mg


Pantoprazole Sodium (Protonix***)  40 mg PO ACBREAKFAST [facility]


   Last Admin: 04/30/18 06:16 Dose:  40 mg


Polyethylene Glycol (Miralax)  17 gm PO DAILY [facility]


   Last Admin: 04/30/18 08:09 Dose:  17 gm


Potassium Chloride (Klor-Con 10)  10 meq PO DAILY [facility]


   Last Admin: 04/30/18 08:10 Dose:  10 meq


Simvastatin (Zocor)  40 mg PO DAILY@2000 [facility]


   Last Admin: 04/29/18 19:43 Dose:  40 mg


Sodium Chloride (Saline Flush)  10 ml FLUSH ASDIRECTED PRN


   PRN Reason: Keep Vein Open


Sodium Chloride (Saline Flush)  10 ml FLUSH ASDIRECTED PRN


   PRN Reason: Keep Vein Open


Temazepam (Restoril)  15 mg PO BEDTIME PRN


   PRN Reason: Sleep











- Exam


General: Reports: Alert, Oriented


HEENT: Reports: Mucous Membr. Moist/Pink


Neck: Reports: Supple


Lungs: Reports: Clear to Auscultation, Normal Respiratory Effort


Cardiovascular: Reports: Regular Rate, Regular Rhythm


GI/Abdominal Exam: Normal Bowel Sounds, Soft, Non-Tender


Back Exam: Reports: Decreased Range of Motion, Vertebral Tenderness


Extremities: Normal Inspection, No Pedal Edema


Skin: Reports: Warm, Dry


Neurological: Reports: No New Focal Deficit

## 2018-07-12 ENCOUNTER — HOSPITAL ENCOUNTER (INPATIENT)
Dept: HOSPITAL 38 - CC.ED | Age: 83
LOS: 2 days | Discharge: HOME | DRG: 641 | End: 2018-07-14
Attending: FAMILY MEDICINE | Admitting: PHYSICIAN ASSISTANT
Payer: MEDICARE

## 2018-07-12 DIAGNOSIS — G89.29: ICD-10-CM

## 2018-07-12 DIAGNOSIS — R53.83: ICD-10-CM

## 2018-07-12 DIAGNOSIS — C90.00: ICD-10-CM

## 2018-07-12 DIAGNOSIS — Z79.899: ICD-10-CM

## 2018-07-12 DIAGNOSIS — R32: ICD-10-CM

## 2018-07-12 DIAGNOSIS — E66.9: ICD-10-CM

## 2018-07-12 DIAGNOSIS — Z96.9: ICD-10-CM

## 2018-07-12 DIAGNOSIS — E87.1: ICD-10-CM

## 2018-07-12 DIAGNOSIS — Z88.8: ICD-10-CM

## 2018-07-12 DIAGNOSIS — M54.9: ICD-10-CM

## 2018-07-12 DIAGNOSIS — E86.0: Primary | ICD-10-CM

## 2018-07-12 DIAGNOSIS — R11.2: ICD-10-CM

## 2018-07-12 DIAGNOSIS — R10.9: ICD-10-CM

## 2018-07-12 DIAGNOSIS — Z96.659: ICD-10-CM

## 2018-07-12 DIAGNOSIS — R53.1: ICD-10-CM

## 2018-07-12 DIAGNOSIS — N39.0: ICD-10-CM

## 2018-07-12 LAB
CHLORIDE SERPL-SCNC: 94 MEQ/L (ref 98–106)
SODIUM SERPL-SCNC: 130 MEQ/L (ref 136–145)

## 2018-07-12 NOTE — EDM.PDOC
ED HPI GENERAL MEDICAL PROBLEM





- General


Chief Complaint: General


Stated Complaint: NAUSEA


Time Seen by Provider: 07/12/18 15:50


Source of Information: Reports: Patient, Family (daughter)


History Limitations: Reports: No Limitations





- History of Present Illness


INITIAL COMMENTS - FREE TEXT/NARRATIVE: 





Had Valcade on Tuesday in [place] and was doing OK until early this morning when 

she started vomiting about 3:30, 4:30, and 6:30.  Was given zofran about 7.  

They did contact [place] this morning and received a call back this afternoon 

after she vomited again at 1.  She had ate some toast and tea without getting 

sic until 1.  Zofran was then repeated but [place] wanted her checked out for 

dehydration so was brought into the ER.  


Onset: Today


Location: Reports: Abdomen


Treatments PTA: Reports: Other (see below)


Other Treatments PTA: ZOFRAN





- Related Data


 Allergies











Allergy/AdvReac Type Severity Reaction Status Date / Time


 


cortisone Allergy  Hives Verified 07/12/18 15:27











Home Meds: 


 Home Meds





Furosemide 1 tab PO DAILY 06/28/15 [History]


Isosorbide Mononitrate [Imdur] 1 tab PO DAILY 06/28/15 [History]


Lisinopril 10 mg PO DAILY 06/28/15 [History]


Metoprolol Tartrate [Lopressor] 1 tab PO BID 06/28/15 [History]


Omeprazole 1 tab PO DAILY 06/28/15 [History]


Oxybutynin 10 mg PO BID 06/28/15 [History]


atorvaSTATin [Lipitor] 1 tab PO DAILY 06/28/15 [History]


traMADol HCl [Tramadol HCl] 1 tab PO Q6H PRN 06/28/15 [History]


Ascorbate Calcium [Vitamin C] 500 mg PO DAILY 04/23/18 [History]


Calcium Carbonate/Vitamin D3 [Calcium 600-Vit D3 400 Tablet] 1 each PO DAILY 04/ 23/18 [History]


Cholecalciferol (Vitamin D3) [Vitamin D3] 1,000 units PO DAILY 04/23/18 [History

]


Gluc/Laurent-Msm#2/C/D3/Jacky/Born [Glucosamin-Chondroitin-MSM] 1 each PO BID 04/23/ 18 [History]


Levothyroxine Sodium [Levoxyl] 50 mcg PO DAILY 04/23/18 [History]


Multivitamins [Tab-A-Faisal] 1 each PO DAILY 04/23/18 [History]


Docusate Sodium [Colace] 100 mg PO BID  cap 04/30/18 [Rx]


Hydrocodone/Acetaminophen [Hydrocodon-Acetaminophen 5-325] 5 - 325 mg PO 

ASDIRECTED PRN #30 tablet 04/30/18 [Rx]


Polyethylene Glycol 3350 [MiraLAX] 17 gm PO DAILY  packet 04/30/18 [Rx]


Bisacodyl [Dulcolax] 5 mg PO BEDTIME 07/12/18 [History]


Dexamethasone 20 mg PO Q7D 07/12/18 [History]


fentaNYL [Duragesic] 50 mcg TD Q3D 07/12/18 [History]


valACYclovir HCl [Valtrex] 500 mg PO DAILY 07/12/18 [History]











Past Medical History


HEENT History: Reports: None


Respiratory History: Reports: None


Gastrointestinal History: Reports: None


Genitourinary History: Reports: Urinary Incontinence


Musculoskeletal History: Reports: Back Pain, Chronic


Neurological History: Reports: None


Endocrine/Metabolic History: Reports: Obesity/BMI 30+


Oncologic (Cancer) History: Reports: Other (See Below)


Other Oncologic History: MULTIPLE MYELOMA





- Past Surgical History


Other Cardiovascular Surgeries/Procedures: STENT PLACEMENT.  Vein stripping


Musculoskeletal Surgical History: Reports: Knee Replacement





Social & Family History





- Family History


Family Medical History: Noncontributory





- Tobacco Use


Smoking Status *Q: Never Smoker


Second Hand Smoke Exposure: No





ED ROS GENERAL





- Review of Systems


Review Of Systems: See Below


Constitutional: Denies: Fever, Chills


HEENT: Reports: No Symptoms


Respiratory: Reports: No Symptoms


Cardiovascular: Reports: No Symptoms


Endocrine: Reports: Fatigue


GI/Abdominal: Reports: Abdominal Pain, Nausea, Vomiting.  Denies: Constipation, 

Diarrhea


Skin: Reports: No Symptoms


Neurological: Reports: No Symptoms





ED EXAM, GENERAL





- Physical Exam


Exam: See Below


Exam Limited By: No Limitations


General Appearance: Alert, WD/WN, Moderate Distress


Ears: Normal External Exam, Normal Canal, Normal TMs


Nose: Normal Inspection


Throat/Mouth: Normal Inspection, Normal Oropharynx, Normal Voice, No Airway 

Compromise


Head: Atraumatic, Normocephalic


Neck: Normal Inspection, Supple, Non-Tender, Full Range of Motion


Respiratory/Chest: No Respiratory Distress, Lungs Clear, Normal Breath Sounds


Cardiovascular: Normal Peripheral Pulses, Regular Rate, Rhythm


GI/Abdominal: Soft, Abnormal Bowel Sounds (hyperactive throughout.).  No: 

Guarding, Rigid


Back Exam: Normal Inspection


Extremities: Pedal Edema (from knees down. Daughter states that she had US done 

on Tuesday when in [place] and had no clots.)


Neurological: Alert, Oriented


Skin Exam: Warm, Dry, Intact





Course





- Vital Signs


Last Recorded V/S: 





 Last Vital Signs











Temp  98.4 F   07/12/18 15:04


 


Pulse  88   07/12/18 15:04


 


Resp  18   07/12/18 15:04


 


BP  126/74   07/12/18 15:04


 


Pulse Ox  96   07/12/18 15:04














- Orders/Labs/Meds


Orders: 





 Active Orders 24 hr











 Category Date Time Status


 


 Ondansetron [Zofran] 8 mg Med  07/12/18 14:44 Active





 Sodium Chloride 0.9% [Normal Saline] 50 ml   





 IV Q8H   








 Medication Orders





Ondansetron HCl 8 mg/ Sodium (Chloride)  54 mls @ 100 mls/hr IV Q8H PRN


   PRN Reason: Nausea


   Last Admin: 07/12/18 15:00  Dose: 100 mls/hr








Labs: 





 Laboratory Tests











  07/12/18 07/12/18 Range/Units





  14:55 14:55 


 


WBC  12.2 H   (5.0-10.0)  10^3/uL


 


RBC  4.05   (4.00-5.50)  10^6/uL


 


Hgb  11.7 L   (12.0-16.0)  g/dL


 


Hct  35.0 L   (37.0-47.0)  %


 


MCV  86.4   (82.0-94.0)  fL


 


MCH  28.9   (27.0-32.0)  pg


 


MCHC  33.4   (33.0-38.0)  g/dL


 


RDW Coeff of Lennox  15.9 H   (11.0-15.0)  %


 


Plt Count  186   (150-400)  10^3/uL


 


Neut % (Auto)  83.4   (35-85)  %


 


Lymph % (Auto)  11.1   (10-55)  %


 


Mono % (Auto)  5.2   (0-16)  %


 


Eos % (Auto)  0.2   (0-5)  %


 


Baso % (Auto)  0.1   (0-3)  %


 


Neut # (Auto)  10.18 H   (1.80-7.00)  10^3/uL


 


Lymph # (Auto)  1.35   (1.00-4.80)  10^3/uL


 


Mono # (Auto)  0.63   (0.00-0.80)  10^3/uL


 


Eos # (Auto)  0.03   (0.00-0.45)  10^3/uL


 


Baso # (Auto)  0.01   10^3/uL


 


Sodium   130 L  (136-145)  mEq/L


 


Potassium   4.2  (3.5-5.0)  mEq/L


 


Chloride   94 L  ()  mEq/L


 


Carbon Dioxide   25  (21-32)  mmol/L


 


BUN   32 H D  (7-18)  mg/dL


 


Creatinine   1.4 H  (0.6-1.0)  mg/dL


 


Est Cr Clr Drug Dosing   TNP  


 


Estimated GFR (MDRD)   36 L  (>=60)  mL/min


 


Glucose   100 H  (75-99)  mg/dL


 


Calcium   8.7  (8.4-10.1)  mg/dL











Meds: 





Medications











Generic Name Dose Route Start Last Admin





  Trade Name Freq  PRN Reason Stop Dose Admin


 


Ondansetron HCl 8 mg/ Sodium  54 mls @ 100 mls/hr  07/12/18 14:44  07/12/18 15:

00





  Chloride  IV   100 mls/hr





  Q8H PRN   Administration





  Nausea   





     





     





     














- Re-Assessments/Exams


Free Text/Narrative Re-Assessment/Exam: 





07/12/18 1640 Discussed case with Dr. Reid and he agrees with admit and plan of 

care.  She will be admitted acute to Dr. Reid services.








Departure





- Departure


Time of Disposition: 17:00


Disposition: Admitted As Inpatient 68


Condition: Fair


Clinical Impression: 


 Weakness





Vomiting


Qualifiers:


 Vomiting type: unspecified Vomiting Intractability: unspecified Nausea presence

: with nausea Qualified Code(s): R11.2 - Nausea with vomiting, unspecified





Multiple myeloma


Qualifiers:


 Multiple myeloma remission status: not in remission Qualified Code(s): C90.00 

- Multiple myeloma not having achieved remission








- Discharge Information


Referrals: 


Armaan Reid MD [Primary Care Provider] - 


Additional Instructions: 


Admit Dr. Reid service acute with IV fluids and Zofran





- Problem List & Annotations


(1) Vomiting


SNOMED Code(s): 076678147


   Code(s): R11.10 - VOMITING, UNSPECIFIED   Status: Acute   Current Visit: Yes

   





(2) Weakness


SNOMED Code(s): 50791406


   Code(s): R53.1 - WEAKNESS   Status: Acute   Current Visit: Yes   





(3) Dehydration


SNOMED Code(s): 17761982


   Code(s): E86.0 - DEHYDRATION   Status: Acute   Current Visit: No   





(4) Multiple myeloma


SNOMED Code(s): 973698789


   Code(s): C90.00 - MULTIPLE MYELOMA NOT HAVING ACHIEVED REMISSION   Status: 

Acute   Current Visit: Yes   





- Problem List Review


Problem List Initiated/Reviewed/Updated: Yes





- My Orders


Last 24 Hours: 





My Active Orders





07/12/18 14:44


Ondansetron [Zofran] 8 mg   Sodium Chloride 0.9% [Normal Saline] 50 ml IV Q8H 














- Assessment/Plan


Admission H&P: Please use this note as an admission H&P


Last 24 Hours: 





My Active Orders





07/12/18 14:44


Ondansetron [Zofran] 8 mg   Sodium Chloride 0.9% [Normal Saline] 50 ml IV Q8H 











Plan: 





Pt will be admitted to acute care to Dr. Reid's service


IV hydration


Zofran to control the nausea.


Up as tolerated with assistance


Meds as ordered


repeat labs in the am

## 2018-07-13 LAB
CHLORIDE SERPL-SCNC: 102 MEQ/L (ref 98–106)
SODIUM SERPL-SCNC: 137 MEQ/L (ref 136–145)

## 2018-07-13 NOTE — PCM.PN
- General Info


Date of Service: 07/13/18


Admission Dx/Problem (Free Text): 


Weakness


N/V


Hyponatremia


UTI


Functional Status: Reports: Pain Controlled, Tolerating Diet, Ambulating





- Review of Systems


General: Reports: Weakness, Fatigue.  Denies: Fever


HEENT: Reports: No Symptoms


Pulmonary: Denies: Shortness of Breath, Cough


Cardiovascular: Denies: Chest Pain, Edema, Lightheadedness


Gastrointestinal: Denies: Abdominal Pain, Nausea, Vomiting


Genitourinary: Reports: Frequency


Musculoskeletal: Reports: Back Pain


Skin: Reports: No Symptoms


Neurological: Reports: Weakness


Psychiatric: Reports: No Symptoms





- Patient Data


Vitals - Most Recent: 


 Last Vital Signs











Temp  96.3 F   07/13/18 08:00


 


Pulse  78   07/13/18 09:00


 


Resp  20   07/13/18 08:00


 


BP  144/58 H  07/13/18 09:00


 


Pulse Ox  93 L  07/13/18 08:00











Weight - Most Recent: 200 lb


I&O - Last 24 Hours: 


 Intake & Output











 07/12/18 07/13/18 07/13/18





 22:59 06:59 14:59


 


Intake Total 0 1235 


 


Output Total 0 1050 


 


Balance 0 185 











Lab Results Last 24 Hours: 


 Laboratory Results - last 24 hr











  07/12/18 07/12/18 07/12/18 Range/Units





  14:55 14:55 19:45 


 


WBC  12.2 H    (5.0-10.0)  10^3/uL


 


RBC  4.05    (4.00-5.50)  10^6/uL


 


Hgb  11.7 L    (12.0-16.0)  g/dL


 


Hct  35.0 L    (37.0-47.0)  %


 


MCV  86.4    (82.0-94.0)  fL


 


MCH  28.9    (27.0-32.0)  pg


 


MCHC  33.4    (33.0-38.0)  g/dL


 


RDW Coeff of Lennox  15.9 H    (11.0-15.0)  %


 


Plt Count  186    (150-400)  10^3/uL


 


Neut % (Auto)  83.4    (35-85)  %


 


Lymph % (Auto)  11.1    (10-55)  %


 


Mono % (Auto)  5.2    (0-16)  %


 


Eos % (Auto)  0.2    (0-5)  %


 


Baso % (Auto)  0.1    (0-3)  %


 


Neut # (Auto)  10.18 H    (1.80-7.00)  10^3/uL


 


Lymph # (Auto)  1.35    (1.00-4.80)  10^3/uL


 


Mono # (Auto)  0.63    (0.00-0.80)  10^3/uL


 


Eos # (Auto)  0.03    (0.00-0.45)  10^3/uL


 


Baso # (Auto)  0.01    10^3/uL


 


Sodium   130 L   (136-145)  mEq/L


 


Potassium   4.2   (3.5-5.0)  mEq/L


 


Chloride   94 L   ()  mEq/L


 


Carbon Dioxide   25   (21-32)  mmol/L


 


BUN   32 H D   (7-18)  mg/dL


 


Creatinine   1.4 H   (0.6-1.0)  mg/dL


 


Est Cr Clr Drug Dosing   TNP   


 


Estimated GFR (MDRD)   36 L   (>=60)  mL/min


 


Glucose   100 H   (75-99)  mg/dL


 


Calcium   8.7   (8.4-10.1)  mg/dL


 


Total Bilirubin     (0.0-1.0)  mg/dL


 


AST     (15-37)  U/L


 


ALT     (12-78)  U/L


 


Alkaline Phosphatase     ()  U/L


 


Total Protein     (6.4-8.2)  g/dL


 


Albumin     (3.4-5.0)  g/dL


 


Urine Color    Yellow  (YELLOW)  


 


Urine Appearance    Slightly cloudy  (CLEAR)  


 


Urine pH    5.5  (4.5-8.0)  


 


Ur Specific Gravity    1.010  (1.003-1.020)  


 


Urine Protein    Negative  (NEGATIVE)  mg/dL


 


Urine Glucose (UA)    Negative  (NEGATIVE)  mg/dL


 


Urine Ketones    Negative  (NEGATIVE)  mg/dL


 


Urine Occult Blood    Negative  (NEGATIVE)  


 


Urine Nitrite    Negative  (NEGATIVE)  


 


Urine Bilirubin    Negative  (NEGATIVE)  


 


Urine Urobilinogen    0.2  (0.2-1.0)  EU/dL


 


Ur Leukocyte Esterase    Moderate H  (NEGATIVE)  


 


Urine RBC    Not seen  (0-5)  /HPF


 


Urine WBC    20-30 H  (0-5)  /HPF


 


Ur Squamous Epith Cells    Few H  (NOT SEEN)  /HPF


 


Urine Bacteria    Moderate H  (NOT SEEN)  /HPF














  07/13/18 07/13/18 Range/Units





  07:05 07:05 


 


WBC  7.7   (5.0-10.0)  10^3/uL


 


RBC  3.96 L   (4.00-5.50)  10^6/uL


 


Hgb  11.3 L   (12.0-16.0)  g/dL


 


Hct  34.7 L   (37.0-47.0)  %


 


MCV  87.6   (82.0-94.0)  fL


 


MCH  28.5   (27.0-32.0)  pg


 


MCHC  32.6 L   (33.0-38.0)  g/dL


 


RDW Coeff of Lennox  16.1 H   (11.0-15.0)  %


 


Plt Count  167   (150-400)  10^3/uL


 


Neut % (Auto)  74.9   (35-85)  %


 


Lymph % (Auto)  15.8   (10-55)  %


 


Mono % (Auto)  8.8   (0-16)  %


 


Eos % (Auto)  0.4   (0-5)  %


 


Baso % (Auto)  0.1   (0-3)  %


 


Neut # (Auto)  5.79   (1.80-7.00)  10^3/uL


 


Lymph # (Auto)  1.22   (1.00-4.80)  10^3/uL


 


Mono # (Auto)  0.68   (0.00-0.80)  10^3/uL


 


Eos # (Auto)  0.03   (0.00-0.45)  10^3/uL


 


Baso # (Auto)  0.01   10^3/uL


 


Sodium   137  (136-145)  mEq/L


 


Potassium   4.2  (3.5-5.0)  mEq/L


 


Chloride   102  ()  mEq/L


 


Carbon Dioxide   25  (21-32)  mmol/L


 


BUN   22 H  (7-18)  mg/dL


 


Creatinine   1.0  (0.6-1.0)  mg/dL


 


Est Cr Clr Drug Dosing   40.38  


 


Estimated GFR (MDRD)   53 L  (>=60)  mL/min


 


Glucose   88  (75-99)  mg/dL


 


Calcium   8.4  (8.4-10.1)  mg/dL


 


Total Bilirubin   0.4  (0.0-1.0)  mg/dL


 


AST   15  (15-37)  U/L


 


ALT   25  (12-78)  U/L


 


Alkaline Phosphatase   126 H  ()  U/L


 


Total Protein   6.1 L  (6.4-8.2)  g/dL


 


Albumin   2.9 L  (3.4-5.0)  g/dL


 


Urine Color    (YELLOW)  


 


Urine Appearance    (CLEAR)  


 


Urine pH    (4.5-8.0)  


 


Ur Specific Gravity    (1.003-1.020)  


 


Urine Protein    (NEGATIVE)  mg/dL


 


Urine Glucose (UA)    (NEGATIVE)  mg/dL


 


Urine Ketones    (NEGATIVE)  mg/dL


 


Urine Occult Blood    (NEGATIVE)  


 


Urine Nitrite    (NEGATIVE)  


 


Urine Bilirubin    (NEGATIVE)  


 


Urine Urobilinogen    (0.2-1.0)  EU/dL


 


Ur Leukocyte Esterase    (NEGATIVE)  


 


Urine RBC    (0-5)  /HPF


 


Urine WBC    (0-5)  /HPF


 


Ur Squamous Epith Cells    (NOT SEEN)  /HPF


 


Urine Bacteria    (NOT SEEN)  /HPF











Balaji Results Last 24 Hours: 


 Microbiology











 07/12/18 19:45 Urine Culture - Preliminary





 Urine, Voided    Gram Negative Rods











Med Orders - Current: 


 Current Medications





Hydrocodone Bitart/Acetaminophen (Norco 325-5 Mg)  1 tab PO Q4H PRN


   PRN Reason: Pain


Bisacodyl (Dulcolax)  5 mg PO BEDTIME [facility]


   Last Admin: 07/12/18 19:23 Dose:  5 mg


Ceftriaxone Sodium (Rocephin)  1 gm IVPUSH Q24H [facility]


Dexamethasone (Dexamethasone)  20 mg PO Q7D [facility]


Docusate Sodium (Colace)  100 mg PO BID [facility]


   Last Admin: 07/13/18 09:00 Dose:  100 mg


Enoxaparin Sodium (Lovenox)  40 mg SUBCUT Q24H [facility]


   Last Admin: 07/12/18 20:15 Dose:  40 mg


Fentanyl (Duragesic)  50 mcg TRDERM Q3D [facility]


Furosemide (Lasix)  40 mg PO DAILY [facility]


   Last Admin: 07/13/18 09:00 Dose:  40 mg


Ondansetron HCl 8 mg/ Sodium (Chloride)  54 mls @ 100 mls/hr IV Q8H PRN


   PRN Reason: Nausea


   Last Admin: 07/12/18 15:00 Dose:  100 mls/hr


Sodium Chloride (Normal Saline)  1,000 mls @ 50 mls/hr IV ASDIRECTED [facility]


   Last Admin: 07/13/18 03:59 Dose:  100 mls/hr


Isosorbide Mononitrate (Imdur)  30 mg PO DAILY [facility]


   Last Admin: 07/13/18 09:00 Dose:  30 mg


Levothyroxine Sodium (Synthroid)  50 mcg PO ACBREAKFAST [facility]


   Last Admin: 07/13/18 06:00 Dose:  50 mcg


Lisinopril (Prinivil)  10 mg PO DAILY [facility]


   Last Admin: 07/13/18 09:00 Dose:  10 mg


Metoprolol Tartrate (Lopressor)  25 mg PO BID [facility]


   Last Admin: 07/13/18 09:00 Dose:  25 mg


Ondansetron HCl (Zofran)  8 mg IV Q6H PRN


   PRN Reason: Nausea/Vomiting


   Last Admin: 07/12/18 18:16 Dose:  8 mg


Oxybutynin Chloride (Oxybutynin)  10 mg PO BID [facility]


   Last Admin: 07/13/18 08:59 Dose:  10 mg


Pantoprazole Sodium (Protonix***)  40 mg PO ACBREAKFAST [facility]


   Last Admin: 07/13/18 06:00 Dose:  40 mg


Polyethylene Glycol (Miralax)  17 gm PO DAILY [facility]


   Last Admin: 07/13/18 08:59 Dose:  17 gm


Tramadol HCl (Ultram)  50 mg PO Q6H PRN


   PRN Reason: Pain


Valacyclovir HCl (Valtrex)  500 mg PO DAILY [facility]


   Last Admin: 07/13/18 09:00 Dose:  500 mg





Discontinued Medications





Hydrocodone Bitart/Acetaminophen (Norco 325-5 Mg)  1 tab PO Q4H PRN


   PRN Reason: Pain


Dexamethasone (Dexamethasone)  20 mg PO Q7D [facility]


   Last Admin: 07/12/18 17:47 Dose:  Not Given


Fentanyl (Duragesic)  50 mcg TRDERM Q3D [facility]


   Last Admin: 07/12/18 17:47 Dose:  Not Given


Trimethoprim/Sulfamethoxazole (Septra Ds)  1 tab PO BID [facility]


   Last Admin: 07/13/18 09:00 Dose:  1 tab











- Exam


General: Alert, Oriented


HEENT: Mucous Membr. Moist/Pink


Neck: Supple


Lungs: Clear to Auscultation, Normal Respiratory Effort


Cardiovascular: Regular Rate, Regular Rhythm


GI/Abdominal Exam: Normal Bowel Sounds, Soft, Non-Tender


Extremities: Normal Inspection, No Pedal Edema


Skin: Warm, Dry


Neurological: No New Focal Deficit





- Problem List & Annotations


(1) Hyponatremia


SNOMED Code(s): 75731721


   Code(s): E87.1 - HYPO-OSMOLALITY AND HYPONATREMIA   Status: Acute   Priority

: High   Current Visit: Yes   





(2) Multiple myeloma


SNOMED Code(s): 595207810


   Code(s): C90.00 - MULTIPLE MYELOMA NOT HAVING ACHIEVED REMISSION   Status: 

Acute   Priority: High   Current Visit: Yes   


Qualifiers: 


   Multiple myeloma remission status: not in remission   Qualified Code(s): 

C90.00 - Multiple myeloma not having achieved remission   





(3) Vomiting


SNOMED Code(s): 281354807


   Code(s): R11.10 - VOMITING, UNSPECIFIED   Status: Acute   Priority: High   

Current Visit: Yes   


Qualifiers: 


   Nausea presence: with nausea 





(4) Weakness


SNOMED Code(s): 74338819


   Code(s): R53.1 - WEAKNESS   Status: Acute   Priority: High   Current Visit: 

Yes   





(5) UTI (urinary tract infection)


SNOMED Code(s): 36943645


   Code(s): N39.0 - URINARY TRACT INFECTION, SITE NOT SPECIFIED   Status: Acute

   Priority: High   Current Visit: Yes   


Qualifiers: 


   Encounter type: initial encounter 





- Problem List Review


Problem List Initiated/Reviewed/Updated: Yes





- My Orders


Last 24 Hours: 


My Active Orders





07/13/18 10:00


cefTRIAXone [Rocephin]   1 gm IVPUSH Q24H 














- Assessment


Assessment:: 





Weakness


Hyponatremia


UTI


Multiple Myeloma


Vomiting





- Plan


Plan:: 





Patient remains weak but has not vomited since last evening around 6 pm.  She 

is on her 5th cycle of Valcade and is due to get an additional medication next 

week for stabilization of the bones.  She has pain in her side, back pain is 

better.  She denies much burning with urination.  No abdominal pain.  No nausea 

at present.  Has been receiving IV fluids, NS, at 100 ml/hr.  Sodium has 

improved to 137 today.  She does have initial urine culture noted to show 

greater than 100,000 gram negative rods.  WBC improved today to 7.7





Will stop Bactrim and start Rocephin today.  Reduce IV fluid rate.  Encourage 

to be up and ambulate today if able.

## 2018-07-14 VITALS — DIASTOLIC BLOOD PRESSURE: 64 MMHG | SYSTOLIC BLOOD PRESSURE: 138 MMHG

## 2018-07-14 NOTE — PCM.DCSUM1
**Discharge Summary





- Hospital Course


HPI Initial Comments: 





Patient will be discharged home with home health. 


Diagnosis: Stroke: No





- Discharge Data


Discharge Disposition: Home, Self-Care 01


Condition: Fair





- Discharge Diagnosis/Problem(s)


(1) Multiple myeloma


SNOMED Code(s): 713678161


   ICD Code: C90.00 - MULTIPLE MYELOMA NOT HAVING ACHIEVED REMISSION   Status: 

Acute   Priority: High   Current Visit: Yes   


Qualifiers: 


   Multiple myeloma remission status: not in remission   Qualified Code(s): 

C90.00 - Multiple myeloma not having achieved remission   





(2) UTI (urinary tract infection)


SNOMED Code(s): 66877116


   ICD Code: N39.0 - URINARY TRACT INFECTION, SITE NOT SPECIFIED   Status: 

Acute   Priority: High   Current Visit: Yes   


Qualifiers: 


   Encounter type: initial encounter 





(3) Vomiting


SNOMED Code(s): 181242105


   ICD Code: R11.10 - VOMITING, UNSPECIFIED   Status: Acute   Priority: High   

Current Visit: Yes   


Qualifiers: 


   Nausea presence: with nausea 





(4) Weakness


SNOMED Code(s): 12827156


   ICD Code: R53.1 - WEAKNESS   Status: Acute   Priority: High   Current Visit: 

Yes   





(5) Dehydration


SNOMED Code(s): 42474997


   ICD Code: E86.0 - DEHYDRATION   Status: Acute   Current Visit: No   





- Patient Instructions


Diet: Usual Diet as Tolerated


Activity: As Tolerated, No Strenuous Activities, Rest and Relax Today


Notify Provider of: Fever, Increased Pain, Nausea and/or Vomiting





- Discharge Plan


*PRESCRIPTION DRUG MONITORING PROGRAM REVIEWED*: Not Applicable


*COPY OF PRESCRIPTION DRUG MONITORING REPORT IN PATIENT JOSE: Not Applicable


Prescriptions/Med Rec: 


Nitrofurantoin Monohyd/M-Cryst [Macrobid 100 mg Capsule] 100 mg PO BID 5 Days #

10 capsule


Home Medications: 


 Home Meds





Furosemide 1 tab PO DAILY 06/28/15 [History]


Isosorbide Mononitrate [Imdur] 1 tab PO DAILY 06/28/15 [History]


Lisinopril 10 mg PO DAILY 06/28/15 [History]


Metoprolol Tartrate [Lopressor] 1 tab PO BID 06/28/15 [History]


Omeprazole 1 tab PO DAILY 06/28/15 [History]


Oxybutynin 10 mg PO BID 06/28/15 [History]


atorvaSTATin [Lipitor] 1 tab PO DAILY 06/28/15 [History]


traMADol HCl [Tramadol HCl] 1 tab PO Q6H PRN 06/28/15 [History]


Ascorbate Calcium [Vitamin C] 500 mg PO DAILY 04/23/18 [History]


Calcium Carbonate/Vitamin D3 [Calcium 600-Vit D3 400 Tablet] 1 each PO DAILY 04/ 23/18 [History]


Cholecalciferol (Vitamin D3) [Vitamin D3] 1,000 units PO DAILY 04/23/18 [History

]


Gluc/Laurent-Msm#2/C/D3/Jacky/Born [Glucosamin-Chondroitin-MSM] 1 each PO BID 04/23/ 18 [History]


Levothyroxine Sodium [Levoxyl] 50 mcg PO DAILY 04/23/18 [History]


Multivitamins [Tab-A-Faisal] 1 each PO DAILY 04/23/18 [History]


Docusate Sodium [Colace] 100 mg PO BID  cap 04/30/18 [Rx]


Hydrocodone/Acetaminophen [Hydrocodon-Acetaminophen 5-325] 5 - 325 mg PO 

ASDIRECTED PRN #30 tablet 04/30/18 [Rx]


Polyethylene Glycol 3350 [MiraLAX] 17 gm PO DAILY  packet 04/30/18 [Rx]


Bisacodyl [Dulcolax] 5 mg PO BEDTIME 07/12/18 [History]


Dexamethasone 20 mg PO Q7D 07/12/18 [History]


fentaNYL [Duragesic] 50 mcg TD Q3D 07/12/18 [History]


valACYclovir HCl [Valtrex] 500 mg PO DAILY 07/12/18 [History]


Nitrofurantoin Monohyd/M-Cryst [Macrobid 100 mg Capsule] 100 mg PO BID 5 Days #

10 capsule 07/14/18 [Rx]








Patient Handouts:  Urinary Tract Infection, Adult, Easy-to-Read


Forms:  ED Department Discharge


Referrals: 


Armaan Reid MD [Primary Care Provider] - 





- Patient Data


Vitals - Most Recent: 


 Last Vital Signs











Temp  98.0 F   07/14/18 08:00


 


Pulse  82   07/14/18 08:00


 


Resp  16   07/14/18 08:00


 


BP  138/64   07/14/18 08:00


 


Pulse Ox  96   07/14/18 08:00











Weight - Most Recent: 200 lb


I&O - Last 24 hours: 


 Intake & Output











 07/13/18 07/14/18 07/14/18





 22:59 06:59 14:59


 


Intake Total 200 400 


 


Output Total  900 225


 


Balance 200 -500 -225











HONORIO Results - Last 24 hrs: 


 Microbiology











 07/12/18 19:45 Urine Culture - Final





 Urine, Voided    Escherichia Coli











Med Orders - Current: 


 Current Medications





Hydrocodone Bitart/Acetaminophen (Norco 325-5 Mg)  1 tab PO Q4H PRN


   PRN Reason: Pain


   Last Admin: 07/14/18 07:51 Dose:  1 tab


Bisacodyl (Dulcolax)  5 mg PO BEDTIME [facility]


   Last Admin: 07/13/18 19:53 Dose:  5 mg


Ceftriaxone Sodium (Rocephin)  1 gm IVPUSH Q24H [facility]


   Last Admin: 07/14/18 10:17 Dose:  1 gm


Dexamethasone (Dexamethasone)  20 mg PO Q7D [facility]


Docusate Sodium (Colace)  100 mg PO BID [facility]


   Last Admin: 07/14/18 07:48 Dose:  100 mg


Enoxaparin Sodium (Lovenox)  40 mg SUBCUT Q24H [facility]


   Last Admin: 07/13/18 20:08 Dose:  40 mg


Fentanyl (Duragesic)  50 mcg TRDERM Q3D [facility]


Furosemide (Lasix)  40 mg PO DAILY [facility]


   Last Admin: 07/14/18 07:49 Dose:  40 mg


Ondansetron HCl 8 mg/ Sodium (Chloride)  54 mls @ 100 mls/hr IV Q8H PRN


   PRN Reason: Nausea


   Last Admin: 07/12/18 15:00 Dose:  100 mls/hr


Sodium Chloride (Normal Saline)  1,000 mls @ 50 mls/hr IV ASDIRECTED [facility]


   Last Admin: 07/13/18 14:04 Dose:  100 mls/hr


Isosorbide Mononitrate (Imdur)  30 mg PO DAILY [facility]


   Last Admin: 07/14/18 07:49 Dose:  30 mg


Levothyroxine Sodium (Synthroid)  50 mcg PO ACBREAKFAST [facility]


   Last Admin: 07/14/18 06:42 Dose:  50 mcg


Lisinopril (Prinivil)  10 mg PO DAILY [facility]


   Last Admin: 07/14/18 07:48 Dose:  10 mg


Metoprolol Tartrate (Lopressor)  25 mg PO BID [facility]


   Last Admin: 07/14/18 07:48 Dose:  25 mg


Ondansetron HCl (Zofran)  8 mg IV Q6H PRN


   PRN Reason: Nausea/Vomiting


   Last Admin: 07/12/18 18:16 Dose:  8 mg


Oxybutynin Chloride (Oxybutynin)  10 mg PO BID [facility]


   Last Admin: 07/14/18 07:48 Dose:  10 mg


Pantoprazole Sodium (Protonix***)  40 mg PO ACBREAKFAST [facility]


   Last Admin: 07/14/18 06:42 Dose:  40 mg


Polyethylene Glycol (Miralax)  17 gm PO DAILY [facility]


   Last Admin: 07/14/18 07:49 Dose:  17 gm


Tramadol HCl (Ultram)  50 mg PO Q6H PRN


   PRN Reason: Pain


   Last Admin: 07/14/18 04:10 Dose:  50 mg


Valacyclovir HCl (Valtrex)  500 mg PO DAILY [facility]


   Last Admin: 07/14/18 07:49 Dose:  500 mg





Discontinued Medications





Hydrocodone Bitart/Acetaminophen (Norco 325-5 Mg)  1 tab PO Q4H PRN


   PRN Reason: Pain


Dexamethasone (Dexamethasone)  20 mg PO Q7D [facility]


   Last Admin: 07/12/18 17:47 Dose:  Not Given


Fentanyl (Duragesic)  50 mcg TRDERM Q3D [facility]


   Last Admin: 07/12/18 17:47 Dose:  Not Given


Trimethoprim/Sulfamethoxazole (Septra Ds)  1 tab PO BID [facility]


   Last Admin: 07/13/18 09:00 Dose:  1 tab

## 2021-02-02 NOTE — PCM.PN
- General Info


Date of Service: 02/02/21


Subjective Update: 


Sara is an 84 yo female with known history of multiple myeloma that does see 

Dr. Orozco for treatment. Was seen in the clinic by Dr. Reid yesterday with weight

loss and decreased appetite the last few weeks. She has been on chemo since 2018

and doesn't feel this has lead to anything. Admitted yesterday her whole body 

was achy. Patient does have known chronic pain. 





Laboratory work did show low sodium and concerns of dehydration. Dr. Reid did 

admit for IV fluids. Patient had CT of the brain yesterday secondary to lumps on

her head. Report was pending this morning. 





She denies any new onset of symptoms today. States she would like to get better 

so she can go home to her  and dogs. 


Functional Status: Reports: Tolerating Diet.  Denies: New Symptoms





- Review of Systems


General: Reports: Weakness, Fatigue


HEENT: Reports: No Symptoms


Pulmonary: Reports: No Symptoms


Cardiovascular: Reports: No Symptoms


Gastrointestinal: Reports: No Symptoms


Genitourinary: Reports: No Symptoms


Musculoskeletal: Reports: No Symptoms


Neurological: Reports: Headache





- Patient Data


Vitals - Most Recent: 


                                Last Vital Signs











Temp  97.3 F   02/02/21 07:38


 


Pulse  102 H  02/02/21 07:38


 


Resp  18   02/02/21 07:38


 


BP  143/82 H  02/02/21 07:38


 


Pulse Ox  98   02/02/21 07:38











Weight - Most Recent: 143 lb


Lab Results Last 24 Hours: 


                         Laboratory Results - last 24 hr











  02/01/21 02/01/21 02/02/21 Range/Units





  12:14 14:07 07:28 


 


WBC    8.1  (5.0-10.0)  10^3/uL


 


RBC    3.36 L  (4.00-5.50)  10^6/uL


 


Hgb    10.2 L  (12.0-16.0)  g/dL


 


Hct    31.6 L  (37.0-47.0)  %


 


MCV    94.0  (82.0-94.0)  fL


 


MCH    30.4  (27.0-32.0)  pg


 


MCHC    32.3 L  (33.0-38.0)  g/dL


 


RDW Coeff of Lennox    16.3 H  (11.0-15.0)  %


 


Plt Count    151  (150-400)  10^3/uL


 


Neut % (Auto)    80.2  (35-85)  %


 


Lymph % (Auto)    9.6 L  (10-55)  %


 


Mono % (Auto)    7.8  (0-16)  %


 


Eos % (Auto)    2.2  (0-5)  %


 


Baso % (Auto)    0.2  (0-3)  %


 


Neut # (Auto)    6.51  (1.80-7.00)  10^3/uL


 


Lymph # (Auto)    0.78 L  (1.00-4.80)  10^3/uL


 


Mono # (Auto)    0.63  (0.00-0.80)  10^3/uL


 


Eos # (Auto)    0.18  (0.00-0.45)  10^3/uL


 


Baso # (Auto)    0.02  10^3/uL


 


Sodium     (136-145)  mEq/L


 


Potassium     (3.5-5.0)  mEq/L


 


Chloride     ()  mEq/L


 


Carbon Dioxide     (21-32)  mmol/L


 


BUN     (7-18)  mg/dL


 


Creatinine     (0.6-1.0)  mg/dL


 


Est Cr Clr Drug Dosing     mL/min


 


Estimated GFR (MDRD)     (>=60)  mL/min


 


Glucose     (75-99)  mg/dL


 


Calcium     (8.4-10.1)  mg/dL


 


Magnesium   1.9   (1.8-2.4)  mg/dL


 


Total Bilirubin     (0.0-1.0)  mg/dL


 


AST     (15-37)  U/L


 


ALT     (12-78)  U/L


 


Alkaline Phosphatase     ()  U/L


 


Total Protein     (6.4-8.2)  g/dL


 


Albumin     (3.4-5.0)  g/dL


 


Influenza Type A RNA  Negative    (NEGATIVE)  


 


RSV RNA (INAAT)  Cancelled    


 


Influenza Type B RNA  Negative    (NEGATIVE)  


 


SARS-CoV-2 RNA (LARA)  Negative    (NEGATIVE)  














  02/02/21 Range/Units





  07:28 


 


WBC   (5.0-10.0)  10^3/uL


 


RBC   (4.00-5.50)  10^6/uL


 


Hgb   (12.0-16.0)  g/dL


 


Hct   (37.0-47.0)  %


 


MCV   (82.0-94.0)  fL


 


MCH   (27.0-32.0)  pg


 


MCHC   (33.0-38.0)  g/dL


 


RDW Coeff of Lennox   (11.0-15.0)  %


 


Plt Count   (150-400)  10^3/uL


 


Neut % (Auto)   (35-85)  %


 


Lymph % (Auto)   (10-55)  %


 


Mono % (Auto)   (0-16)  %


 


Eos % (Auto)   (0-5)  %


 


Baso % (Auto)   (0-3)  %


 


Neut # (Auto)   (1.80-7.00)  10^3/uL


 


Lymph # (Auto)   (1.00-4.80)  10^3/uL


 


Mono # (Auto)   (0.00-0.80)  10^3/uL


 


Eos # (Auto)   (0.00-0.45)  10^3/uL


 


Baso # (Auto)   10^3/uL


 


Sodium  137  (136-145)  mEq/L


 


Potassium  4.5  (3.5-5.0)  mEq/L


 


Chloride  102  ()  mEq/L


 


Carbon Dioxide  23  (21-32)  mmol/L


 


BUN  17  (7-18)  mg/dL


 


Creatinine  0.9  (0.6-1.0)  mg/dL


 


Est Cr Clr Drug Dosing  42.62  mL/min


 


Estimated GFR (MDRD)  60  (>=60)  mL/min


 


Glucose  103 H  (75-99)  mg/dL


 


Calcium  8.9  (8.4-10.1)  mg/dL


 


Magnesium   (1.8-2.4)  mg/dL


 


Total Bilirubin  0.8  (0.0-1.0)  mg/dL


 


AST  79 H  (15-37)  U/L


 


ALT  58  (12-78)  U/L


 


Alkaline Phosphatase  224 H  ()  U/L


 


Total Protein  6.7  (6.4-8.2)  g/dL


 


Albumin  2.5 L  (3.4-5.0)  g/dL


 


Influenza Type A RNA   (NEGATIVE)  


 


RSV RNA (INAAT)   


 


Influenza Type B RNA   (NEGATIVE)  


 


SARS-CoV-2 RNA (LARA)   (NEGATIVE)  











Med Orders - Current: 


                               Current Medications





Acetaminophen (Tylenol)  650 mg PO Q4H PRN


   PRN Reason: Pain (Mild 1-3)/fever


Bisacodyl (Dulcolax)  5 mg PO BEDTIME PRN


   PRN Reason: Constipation


Docusate Sodium (Colace)  100 mg PO BID [facility]


   Last Admin: 02/02/21 07:37 Dose:  100 mg


   Documented by: 


Enoxaparin Sodium (Lovenox)  40 mg SUBCUT Q24H Counts include 234 beds at the [facility]


   Last Admin: 02/01/21 19:13 Dose:  40 mg


   Documented by: 


Fentanyl (Duragesic)  12 mcg TRDERM Q72H Counts include 234 beds at the [facility]


   Last Admin: 02/02/21 08:40 Dose:  12 mcg


   Documented by: 


Isosorbide Mononitrate (Imdur)  30 mg PO DAILY Counts include 234 beds at the [facility]


   Last Admin: 02/02/21 07:34 Dose:  30 mg


   Documented by: 


Levothyroxine Sodium (Synthroid)  50 mcg PO ACBREAKFAST Counts include 234 beds at the [facility]


   Last Admin: 02/02/21 06:26 Dose:  50 mcg


   Documented by: 


Metoprolol Tartrate (Lopressor)  25 mg PO BIDMEALS Counts include 234 beds at the [facility]


   Last Admin: 02/02/21 07:33 Dose:  25 mg


   Documented by: 


Atorvastatin [ Lipitor] 80 Mg Tab * *Ptom**  0 mg PO DAILY Counts include 234 beds at the [facility]


   Last Admin: 02/02/21 07:34 Dose:  80 mg


   Documented by: 


Potassium Chloride [ Potassium Chloride] 20 Meq Tab **Ptom**  0 meq PO DAILY Counts include 234 beds at the [facility]


   Last Admin: 02/02/21 07:33 Dose:  40 meq


   Documented by: 


Omeprazole [ Omeprazole] 20 Mg Cap **Ptom**  0 mg PO ACBREAKFAST Counts include 234 beds at the [facility]


   Last Admin: 02/02/21 06:25 Dose:  20 mg


   Documented by: 


Ondansetron HCl (Zofran Odt)  4 mg PO Q4H PRN


   PRN Reason: nausea, able to take PO


Oxybutynin Chloride (Oxybutynin)  10 mg PO BID Counts include 234 beds at the [facility]


   Last Admin: 02/02/21 07:34 Dose:  10 mg


   Documented by: 


Oxycodone HCl (Oxycodone)  5 mg PO Q4H PRN


   PRN Reason: Breakthrough Pain


   Last Admin: 02/02/21 06:38 Dose:  5 mg


   Documented by: 


Polyethylene Glycol (Miralax)  17 gm PO DAILY PRN


   PRN Reason: Constipation


Tramadol HCl (Ultram)  50 mg PO BID Counts include 234 beds at the [facility]


   Last Admin: 02/02/21 07:32 Dose:  50 mg


   Documented by: 


Valacyclovir HCl (Valtrex)  500 mg PO DAILY@1730 Counts include 234 beds at the [facility]





Discontinued Medications





Hydromorphone HCl (Dilaudid)  1 mg IVPUSH Q2H PRN


   PRN Reason: Pain


   Last Admin: 02/02/21 01:30 Dose:  1 mg


   Documented by: 


Hydromorphone HCl (Dilaudid)  1 mg IVPUSH Q2H PRN


   PRN Reason: Pain


Sodium Chloride (Normal Saline)  1,000 mls @ 75 mls/hr IV ASDIRECTED Counts include 234 beds at the [facility]


   Last Admin: 02/01/21 15:12 Dose:  75 mls/hr


   Documented by: 











- Exam


General: Alert, Oriented, Cooperative


Lungs: Clear to Auscultation, Normal Respiratory Effort


Cardiovascular: Regular Rate, Regular Rhythm, No Murmurs


GI/Abdominal Exam: Normal Bowel Sounds, Soft, Non-Tender, No Organomegaly, No 

Distention, No Mass


Back Exam: Other (scoliosis noted)


Extremities: Normal Inspection, Pedal Edema (1+ non pitting bilaterally)


Skin: Warm, Dry, Intact


Psy/Mental Status: Alert, Normal Affect, Normal Mood





Sepsis Event Note





- Evaluation


Sepsis Screening Result: No Definite Risk





- Focused Exam


Vital Signs: 


                                   Vital Signs











  Temp Pulse Pulse Resp BP BP Pulse Ox


 


 02/02/21 07:38  97.3 F   102 H  18   143/82 H  98


 


 02/02/21 07:34      143/82 H  


 


 02/02/21 07:33   102 H    143/82 H  


 


 02/02/21 02:00  99.2 F   95  18   143/61 H  95














- Problem List & Annotations


(1) Dehydration


SNOMED Code(s): 81016729


   Code(s): E86.0 - DEHYDRATION   Status: Resolved   Current Visit: No   





(2) Hyponatremia


SNOMED Code(s): 26417347


   Code(s): E87.1 - HYPO-OSMOLALITY AND HYPONATREMIA   Status: Resolved   

Priority: High   Current Visit: No   





(3) Weakness


SNOMED Code(s): 02541657


   Code(s): R53.1 - WEAKNESS   Status: Acute   Priority: High   Current Visit: 

No   





- Problem List Review


Problem List Initiated/Reviewed/Updated: Yes





- My Orders


Last 24 Hours: 


My Active Orders





02/02/21 08:15


PT Evaluation and Treatment [CONS] Routine 


fentaNYL [Duragesic]   12 mcg TRDERM Q72H 














- Plan


Plan:: 


Patient stable this morning. Continues to have chronic pain, will start Fentanyl

 patch as patient has done well with this in the past. CT head did not show any 

concerning lesions. Lesions to scalp are hard and non-mobile. Discussed with 

Carolina (grandson) if they would like further evaluation may want to 

consider MRI of the brain, which they didn't feel would change treatment at this

 time. Physical therapy ordered today for strengthening. Labs stable. Will 

possibly plan for discharge tomorrow am if doing well. Patient in agreement. 

Sodium is back within normal limits. Dr. Reid did see patient this morning as 

well.

## 2021-02-03 NOTE — PCM.DCSUM1
**Discharge Summary





- Hospital Course


HPI Initial Comments: 


Sara is an 84 yo female with known history of multiple myeloma that does see 

Dr. Orozco for treatment. Was seen in the clinic by Dr. Reid on the 1st of February with weight loss and decreased appetite the last few weeks. She has 

been on chemo since 2018 and doesn't feel this has lead to anything. Patient 

does have chronic pain and didn't feel the Tramadol was helping. Admitted her 

whole body has been achy. Laboratory work did show low sodium and concerns of 

dehydration. Dr. Reid did admit for IV fluids. Patient had CT of the brain 

yesterday secondary to lumps on her head and chronic headache, which was 

negative. 


Diagnosis: Stroke: No





- Discharge Data


Discharge Date: 02/03/21


Discharge Disposition: Home, W Home Health Agency 06


Condition: Good





- Referral to Home Health


Date of Face to Face Encounter: 02/03/21


Reason for Homebound Status: Inability to drive. Patient is homebound secondary 

to decreased activity tolerance needing assistive device for ambulation. Patient

is a high fall risk secondary to chronic weakness.


Primary Care Physician: 


Armaan Reid MD





Skilled Need: Nursing to monitor vital signs and medications d/t recent changes.





- Discharge Diagnosis/Problem(s)


(1) Dehydration


SNOMED Code(s): 13129165


   ICD Code: E86.0 - DEHYDRATION   Status: Resolved   





(2) Hyponatremia


SNOMED Code(s): 86877151


   ICD Code: E87.1 - HYPO-OSMOLALITY AND HYPONATREMIA   Status: Resolved   

Priority: High   





(3) Weakness


SNOMED Code(s): 14768875


   ICD Code: R53.1 - WEAKNESS   Status: Acute   Priority: High   





- Patient Summary/Data


Consults: 


                                  Consultations





02/02/21 08:15


PT Evaluation and Treatment [CONS] Routine 














- Patient Instructions


Diet: Usual Diet as Tolerated


Activity: As Tolerated (with walker)





- Discharge Plan


Prescriptions/Med Rec: 


Celecoxib [CeleBREX] 100 mg PO DAILY #60 cap


fentaNYL [Duragesic] 12 mcg TRDERM Q72H #10 patch


Home Medications: 


                                    Home Meds





Furosemide 1 tab PO DAILY 06/28/15 [History]


Isosorbide Mononitrate [Imdur] 30 mg PO DAILY 06/28/15 [History]


Metoprolol Tartrate [Lopressor] 1 tab PO BID 06/28/15 [History]


Omeprazole 20 mg PO DAILY 06/28/15 [History]


Oxybutynin 10 mg PO BID 06/28/15 [History]


atorvaSTATin [Lipitor] 80 mg PO BEDTIME 06/28/15 [History]


traMADol HCl [Tramadol HCl] 1 tab PO BID 06/28/15 [History]


Ascorbate Calcium [Vitamin C] 500 mg PO DAILY 04/23/18 [History]


Calcium Carbonate/Vitamin D3 [Calcium 600-Vit D3 400 Tablet] 1 each PO DAILY 

04/23/18 [History]


Cholecalciferol (Vitamin D3) [Vitamin D3] 1,000 units PO DAILY 04/23/18 

[History]


Glucosam/Chond-MSM 2/C/D3/Jacky [Glucosamin-Chondroitin-MSM] 1 each PO BID 

04/23/18 [History]


Levothyroxine Sodium [Levoxyl] 50 mcg PO DAILY 04/23/18 [History]


Multivitamins [Tab-A-Faisal] 1 each PO DAILY 04/23/18 [History]


Docusate Sodium [Colace] 100 mg PO BID  cap 04/30/18 [Rx]


bisacodyL [Dulcolax] 5 mg PO BEDTIME PRN 07/12/18 [History]


valACYclovir HCl [Valtrex] 500 mg PO DAILY 07/12/18 [History]


Potassium Chloride 20 meq PO BID 02/01/21 [History]


Zinc 20 mg PO DAILY 02/01/21 [History]


polyethylene glycoL 3350 [MiraLAX] 17 gm PO DAILY PRN 02/01/21 [History]


Celecoxib [CeleBREX] 100 mg PO DAILY #60 cap 02/03/21 [Rx]


fentaNYL [Duragesic] 12 mcg TRDERM Q72H #10 patch 02/03/21 [Rx]











- Discharge Summary/Plan Comment


DC Time >30 min.: Yes


Discharge Summary/Plan Comment: 


Sodium has improved. Patient has been stable and currently appears to be at 

baseline. patient will be discharged home today. Will start Celebrex for pain 

and continue Fentanyl patch. Home health referral as patient continues to have 

generalized weakness, discharge on new medications, unable to drive.





- General Info


Date of Service: 02/03/21


Functional Status: Reports: Pain Controlled





- Review of Systems


General: Reports: Weakness, Fatigue.  Denies: Fever, Chills, Appetite


HEENT: Reports: No Symptoms


Pulmonary: Reports: No Symptoms


Cardiovascular: Reports: No Symptoms


Gastrointestinal: Reports: Constipation.  Denies: Nausea, Vomiting


Genitourinary: Reports: No Symptoms


Musculoskeletal: Reports: Back Pain


Skin: Reports: No Symptoms


Neurological: Reports: Headache.  Denies: Confusion





- Patient Data


Vitals - Most Recent: 


                                Last Vital Signs











Temp  98 F   02/03/21 14:00


 


Pulse  100   02/03/21 14:00


 


Resp  18   02/03/21 14:00


 


BP  122/67   02/03/21 14:00


 


Pulse Ox  97   02/03/21 14:00











Weight - Most Recent: 143 lb


Lab Results - Last 24 hrs: 


                         Laboratory Results - last 24 hr











  02/03/21 02/03/21 Range/Units





  09:25 09:25 


 


WBC  9.4   (5.0-10.0)  10^3/uL


 


RBC  3.23 L   (4.00-5.50)  10^6/uL


 


Hgb  9.8 L   (12.0-16.0)  g/dL


 


Hct  30.4 L   (37.0-47.0)  %


 


MCV  94.1 H   (82.0-94.0)  fL


 


MCH  30.3   (27.0-32.0)  pg


 


MCHC  32.2 L   (33.0-38.0)  g/dL


 


RDW Coeff of Lennox  16.2 H   (11.0-15.0)  %


 


Plt Count  152   (150-400)  10^3/uL


 


Neut % (Auto)  84.1   (35-85)  %


 


Lymph % (Auto)  7.8 L   (10-55)  %


 


Mono % (Auto)  7.1   (0-16)  %


 


Eos % (Auto)  0.8   (0-5)  %


 


Baso % (Auto)  0.2   (0-3)  %


 


Neut # (Auto)  7.93 H   (1.80-7.00)  10^3/uL


 


Lymph # (Auto)  0.74 L   (1.00-4.80)  10^3/uL


 


Mono # (Auto)  0.67   (0.00-0.80)  10^3/uL


 


Eos # (Auto)  0.08   (0.00-0.45)  10^3/uL


 


Baso # (Auto)  0.02   10^3/uL


 


Sodium   134 L  (136-145)  mEq/L


 


Potassium   4.2  (3.5-5.0)  mEq/L


 


Chloride   102  ()  mEq/L


 


Carbon Dioxide   24  (21-32)  mmol/L


 


BUN   15  (7-18)  mg/dL


 


Creatinine   0.9  (0.6-1.0)  mg/dL


 


Est Cr Clr Drug Dosing   42.62  mL/min


 


Estimated GFR (MDRD)   60  (>=60)  mL/min


 


Glucose   133 H D  (75-99)  mg/dL


 


Calcium   8.8  (8.4-10.1)  mg/dL


 


Total Bilirubin   0.7  (0.0-1.0)  mg/dL


 


AST   82 H  (15-37)  U/L


 


ALT   70  (12-78)  U/L


 


Alkaline Phosphatase   228 H  ()  U/L


 


Total Protein   6.6  (6.4-8.2)  g/dL


 


Albumin   2.4 L  (3.4-5.0)  g/dL











Med Orders - Current: 


                               Current Medications








Discontinued Medications





Acetaminophen (Tylenol)  650 mg PO Q4H PRN


   PRN Reason: Pain (Mild 1-3)/fever


   Last Admin: 02/03/21 17:36 Dose:  650 mg


   Documented by: 


Bisacodyl (Dulcolax)  5 mg PO BEDTIME PRN


   PRN Reason: Constipation


Docusate Sodium (Colace)  100 mg PO BID [facility]


   Last Admin: 02/03/21 07:10 Dose:  100 mg


   Documented by: 


Enoxaparin Sodium (Lovenox)  40 mg SUBCUT Q24H [facility]


   Last Admin: 02/02/21 19:40 Dose:  40 mg


   Documented by: 


Fentanyl (Duragesic)  12 mcg TRDERM Q72H [facility]


   Last Admin: 02/02/21 08:40 Dose:  12 mcg


   Documented by: 


Hydromorphone HCl (Dilaudid)  1 mg IVPUSH Q2H PRN


   PRN Reason: Pain


   Last Admin: 02/02/21 01:30 Dose:  1 mg


   Documented by: 


Hydromorphone HCl (Dilaudid)  1 mg IVPUSH Q2H PRN


   PRN Reason: Pain


Sodium Chloride (Normal Saline)  1,000 mls @ 75 mls/hr IV ASDIRECTED [facility]


   Last Admin: 02/01/21 15:12 Dose:  75 mls/hr


   Documented by: 


Isosorbide Mononitrate (Imdur)  30 mg PO DAILY [facility]


   Last Admin: 02/03/21 07:09 Dose:  30 mg


   Documented by: 


Levothyroxine Sodium (Synthroid)  50 mcg PO ACBREAKFAST [facility]


   Last Admin: 02/03/21 06:53 Dose:  50 mcg


   Documented by: 


Metoprolol Tartrate (Lopressor)  25 mg PO BIDMEALS [facility]


   Last Admin: 02/03/21 17:45 Dose:  Not Given


   Documented by: 


Atorvastatin [ Lipitor] 80 Mg Tab * *Ptom**  0 mg PO DAILY [facility]


   Last Admin: 02/03/21 07:08 Dose:  80 mg


   Documented by: 


Potassium Chloride [ Potassium Chloride] 20 Meq Tab **Ptom**  0 meq PO DAILY [facility]


   Last Admin: 02/03/21 07:10 Dose:  40 meq


   Documented by: 


Omeprazole [ Omeprazole] 20 Mg Cap **Ptom**  0 mg PO ACBREAKFAST [facility]


   Last Admin: 02/03/21 06:53 Dose:  20 mg


   Documented by: 


Ondansetron HCl (Zofran Odt)  4 mg PO Q4H PRN


   PRN Reason: nausea, able to take PO


Oxybutynin Chloride (Oxybutynin)  10 mg PO BID [facility]


   Last Admin: 02/03/21 07:10 Dose:  10 mg


   Documented by: 


Oxycodone HCl (Oxycodone)  5 mg PO Q4H PRN


   PRN Reason: Breakthrough Pain


   Last Admin: 02/03/21 01:48 Dose:  5 mg


   Documented by: 


Polyethylene Glycol (Miralax)  17 gm PO DAILY PRN


   PRN Reason: Constipation


Tramadol HCl (Ultram)  50 mg PO BID [facility]


   Last Admin: 02/03/21 07:11 Dose:  50 mg


   Documented by: 


Valacyclovir HCl (Valtrex)  500 mg PO DAILY@1730 [facility]


   Last Admin: 02/03/21 17:45 Dose:  Not Given


   Documented by: 











- Exam


General: Reports: Alert, Cooperative, No Acute Distress


Lungs: Reports: Clear to Auscultation, Normal Respiratory Effort


Cardiovascular: Reports: Regular Rate, Regular Rhythm, No Murmurs


GI/Abdominal Exam: Normal Bowel Sounds, Soft, No Distention, No Mass


Extremities: Pedal Edema (trace bilaterally)


Skin: Reports: Warm, Dry, Intact


Neurological: Reports: No New Focal Deficit


Psy/Mental Status: Reports: Alert, Normal Affect, Normal Mood

## 2021-02-04 NOTE — EDM.PDOC
ED HPI GENERAL MEDICAL PROBLEM





- General


Chief Complaint: General


Stated Complaint: weakness


Time Seen by Provider: 02/04/21 19:30


Source of Information: Reports: Patient, RN (Sandra Keith from [facility]


History Limitations: Reports: Other (weakness)





- History of Present Illness


INITIAL COMMENTS - FREE TEXT/NARRATIVE: 





Pt was discharged yesterday to home and was to be admitted to home health or 

hospice today.  HH nurse states taht when she arrived there the family- daughter

and  couldn't decide if she should be HH or hospice.  She was having pain

that the fentanyl wasn't controlling.  Pt states that she hurts and wanted 

better pain control.  She states that the pain is in her back and hips 

bilaterally.  She is having trouble getting around at home due to the weakness 

and needs more help.  She denies any falls while at home.


Onset: Gradual


Location: Reports: Back, Pelvis


Quality: Reports: Throbbing


Severity: Moderate


Associated Symptoms: Reports: Weakness


  ** Bilateral Shoulder


Pain Score (Numeric/FACES): 8





- Related Data


                                    Allergies











Allergy/AdvReac Type Severity Reaction Status Date / Time


 


cortisone Allergy  Hives Verified 02/04/21 18:42


 


Sulfa (Sulfonamide Allergy  Hives Verified 02/04/21 18:42





Antibiotics)     











Home Meds: 


                                    Home Meds





Furosemide 1 tab PO DAILY 06/28/15 [History]


Isosorbide Mononitrate [Imdur] 30 mg PO DAILY 06/28/15 [History]


Metoprolol Tartrate [Lopressor] 1 tab PO BID 06/28/15 [History]


Omeprazole 20 mg PO DAILY 06/28/15 [History]


Oxybutynin 10 mg PO BID 06/28/15 [History]


atorvaSTATin [Lipitor] 80 mg PO BEDTIME 06/28/15 [History]


traMADol HCl [Tramadol HCl] 1 tab PO BID 06/28/15 [History]


Ascorbate Calcium [Vitamin C] 500 mg PO DAILY 04/23/18 [History]


Calcium Carbonate/Vitamin D3 [Calcium 600-Vit D3 400 Tablet] 1 each PO DAILY 

04/23/18 [History]


Cholecalciferol (Vitamin D3) [Vitamin D3] 1,000 units PO DAILY 04/23/18 

[History]


Glucosam/Chond-MSM 2/C/D3/Jacky [Glucosamin-Chondroitin-MSM] 1 each PO BID 

04/23/18 [History]


Levothyroxine Sodium [Levoxyl] 50 mcg PO DAILY 04/23/18 [History]


Multivitamins [Tab-A-Faisal] 1 each PO DAILY 04/23/18 [History]


Docusate Sodium [Colace] 100 mg PO BID  cap 04/30/18 [Rx]


bisacodyL [Dulcolax] 5 mg PO BEDTIME PRN 07/12/18 [History]


valACYclovir HCl [Valtrex] 500 mg PO DAILY 07/12/18 [History]


Potassium Chloride 20 meq PO BID 02/01/21 [History]


Zinc 20 mg PO DAILY 02/01/21 [History]


polyethylene glycoL 3350 [MiraLAX] 17 gm PO DAILY PRN 02/01/21 [History]


Celecoxib [CeleBREX] 100 mg PO DAILY #60 cap 02/03/21 [Rx]


fentaNYL [Duragesic] 12 mcg TRDERM Q72H #10 patch 02/03/21 [Rx]











Past Medical History


HEENT History: Reports: None


Respiratory History: Reports: None


Gastrointestinal History: Reports: None


Genitourinary History: Reports: Urinary Incontinence


Musculoskeletal History: Reports: Back Pain, Chronic


Neurological History: Reports: None


Endocrine/Metabolic History: Reports: Obesity/BMI 30+


Oncologic (Cancer) History: Reports: Other (See Below)


Other Oncologic History: MULTIPLE MYELOMA





- Infectious Disease History


Infectious Disease History: Reports: Other (See Below)


Other Infectious Disease History: Covid 19





- Past Surgical History


Other Cardiovascular Surgeries/Procedures: STENT PLACEMENT.  Vein stripping


Musculoskeletal Surgical History: Reports: Knee Replacement





Social & Family History





- Family History


Family Medical History: No Pertinent Family History





- Tobacco Use


Tobacco Use Status *Q: Never Tobacco User


Second Hand Smoke Exposure: No





- Caffeine Use


Caffeine Use: Reports: None





- Recreational Drug Use


Recreational Drug Use: No





- Living Situation & Occupation


Living situation: Reports: , with Spouse


Occupation: Retired





ED ROS GENERAL





- Review of Systems


Review Of Systems: See Below


Constitutional: Reports: Weakness.  Denies: Fever, Chills


HEENT: Reports: No Symptoms


Respiratory: Reports: No Symptoms


Cardiovascular: Reports: No Symptoms


GI/Abdominal: Reports: Anorexia


: Reports: No Symptoms


Musculoskeletal: Reports: Back Pain, Joint Pain


Skin: Reports: No Symptoms


Neurological: Reports: Difficulty Walking, Weakness





ED EXAM, GENERAL





- Physical Exam


Exam: See Below


Exam Limited By: No Limitations


General Appearance: Alert, Moderate Distress


Ears: Normal External Exam, Normal Canal, Normal TMs


Nose: Normal Inspection


Throat/Mouth: Normal Inspection, Normal Oropharynx


Head: Atraumatic, Normocephalic


Neck: Normal Inspection, Supple, Non-Tender, Full Range of Motion


Respiratory/Chest: No Respiratory Distress, Lungs Clear


Cardiovascular: Regular Rate, Rhythm, Other (trace of edema bilaterally.)


GI/Abdominal: Normal Bowel Sounds, Soft, Non-Tender


Back Exam: Other (tender to lower back and into her hips with any movement or 

palpation.  )


Extremities: Pedal Edema (trace to 1+ bilaterally.)


Neurological: Alert, Oriented


Skin Exam: Warm, Dry





Course





- Vital Signs


Last Recorded V/S: 





                                Last Vital Signs











Temp  97.1 F   02/04/21 18:45


 


Pulse  104 H  02/04/21 18:45


 


Resp  18   02/04/21 18:45


 


BP  153/85 H  02/04/21 18:45


 


Pulse Ox  96   02/04/21 18:45














- Orders/Labs/Meds


Orders: 





                                Medication Orders





Atorvastatin Calcium (Lipitor)  80 mg PO BEDTIME [facility]


Bisacodyl (Dulcolax)  5 mg PO BEDTIME PRN


   PRN Reason: Constipation


Celecoxib (Celebrex)  100 mg PO DAILY [facility]


Docusate Sodium (Colace)  100 mg PO BID [facility]


Enoxaparin Sodium (Lovenox)  40 mg SUBCUT BEDTIME [facility]


Fentanyl (Duragesic)  12 mcg TRDERM Q72H [facility]


Fentanyl (Fentanyl)  12.5 mcg IVPUSH Q6H PRN


   PRN Reason: Breakthrough Pain


Furosemide (Lasix)  40 mg PO DAILY [facility]


Sodium Chloride (Normal Saline)  1,000 mls @ 75 mls/hr IV ASDIRECTED [facility]


Isosorbide Mononitrate (Imdur)  30 mg PO DAILY [facility]


Levothyroxine Sodium (Synthroid)  50 mcg PO DAILY [facility]


Metoprolol Tartrate (Lopressor)  25 mg PO BID [facility]


Oxybutynin Chloride (Oxybutynin)  10 mg PO BID [facility]


Pantoprazole Sodium (Protonix Iv***)  40 mg IVPUSH Q12H [facility]


Polyethylene Glycol (Miralax)  17 gm PO DAILY PRN


   PRN Reason: Constipation


Potassium Chloride (Klor-Con 10)  20 meq PO BID [facility]


Valacyclovir HCl (Valtrex)  500 mg PO DAILY [facility]








Labs: 





                                Laboratory Tests











  02/04/21 02/04/21 02/04/21 Range/Units





  19:50 19:50 19:50 


 


WBC  8.7    (5.0-10.0)  10^3/uL


 


RBC  3.02 L    (4.00-5.50)  10^6/uL


 


Hgb  9.3 L    (12.0-16.0)  g/dL


 


Hct  28.3 L    (37.0-47.0)  %


 


MCV  93.7    (82.0-94.0)  fL


 


MCH  30.8    (27.0-32.0)  pg


 


MCHC  32.9 L    (33.0-38.0)  g/dL


 


RDW Coeff of Lennox  16.1 H    (11.0-15.0)  %


 


Plt Count  135 L    (150-400)  10^3/uL


 


Neut % (Auto)  80.2    (35-85)  %


 


Lymph % (Auto)  9.3 L    (10-55)  %


 


Mono % (Auto)  8.0    (0-16)  %


 


Eos % (Auto)  2.3    (0-5)  %


 


Baso % (Auto)  0.2    (0-3)  %


 


Neut # (Auto)  7.00    (1.80-7.00)  10^3/uL


 


Lymph # (Auto)  0.81 L    (1.00-4.80)  10^3/uL


 


Mono # (Auto)  0.70    (0.00-0.80)  10^3/uL


 


Eos # (Auto)  0.20    (0.00-0.45)  10^3/uL


 


Baso # (Auto)  0.02    10^3/uL


 


Sodium   133 L   (136-145)  mEq/L


 


Potassium   4.0   (3.5-5.0)  mEq/L


 


Chloride   100   ()  mEq/L


 


Carbon Dioxide   23   (21-32)  mmol/L


 


BUN   17   (7-18)  mg/dL


 


Creatinine   1.0   (0.6-1.0)  mg/dL


 


Est Cr Clr Drug Dosing   38.36   mL/min


 


Estimated GFR (MDRD)   53 L   (>=60)  mL/min


 


Glucose   109 H   (75-99)  mg/dL


 


Lactic Acid    1.4  (0.4-2.0)  mmol/L


 


Calcium   8.7   (8.4-10.1)  mg/dL


 


Total Bilirubin   0.7   (0.0-1.0)  mg/dL


 


AST   106 H   (15-37)  U/L


 


ALT   92 H   (12-78)  U/L


 


Alkaline Phosphatase   247 H   ()  U/L


 


C-Reactive Protein   8.9 H   (0.2-0.8)  mg/dL


 


Total Protein   6.5   (6.4-8.2)  g/dL


 


Albumin   2.4 L   (3.4-5.0)  g/dL











Meds: 





Medications











Generic Name Dose Route Start Last Admin





  Trade Name Freq  PRN Reason Stop Dose Admin


 


Atorvastatin Calcium  80 mg  02/05/21 20:00 





  Lipitor  PO  





  BEDTIME [facility]  


 


Bisacodyl  5 mg  02/04/21 21:25 





  Dulcolax  PO  





  BEDTIME PRN  





  Constipation  


 


Celecoxib  100 mg  02/05/21 08:00 





  Celebrex  PO  





  DAILY [facility]  


 


Docusate Sodium  100 mg  02/05/21 08:00 





  Colace  PO  





  BID [facility]  


 


Enoxaparin Sodium  40 mg  02/04/21 21:30 





  Lovenox  SUBCUT  





  BEDTIME [facility]  


 


Fentanyl  12 mcg  02/05/21 09:00 





  Duragesic  TRDERM  





  Q72H [facility]  


 


Fentanyl  12.5 mcg  02/04/21 21:24 





  Fentanyl  IVPUSH  





  Q6H PRN  





  Breakthrough Pain  


 


Furosemide  40 mg  02/05/21 08:00 





  Lasix  PO  





  DAILY [facility]  


 


Sodium Chloride  1,000 mls @ 75 mls/hr  02/04/21 21:30 





  Normal Saline  IV  





  ASDIRECTED [facility]  


 


Isosorbide Mononitrate  30 mg  02/05/21 08:00 





  Imdur  PO  





  DAILY [facility]  


 


Levothyroxine Sodium  50 mcg  02/05/21 08:00 





  Synthroid  PO  





  DAILY [facility]  


 


Metoprolol Tartrate  25 mg  02/05/21 08:00 





  Lopressor  PO  





  BID [facility]  


 


Oxybutynin Chloride  10 mg  02/05/21 08:00 





  Oxybutynin  PO  





  BID [facility]  


 


Pantoprazole Sodium  40 mg  02/04/21 21:30 





  Protonix Iv***  IVPUSH  





  Q12H [facility]  


 


Polyethylene Glycol  17 gm  02/04/21 21:25 





  Miralax  PO  





  DAILY PRN  





  Constipation  


 


Potassium Chloride  20 meq  02/05/21 08:00 





  Klor-Con 10  PO  





  BID [facility]  


 


Valacyclovir HCl  500 mg  02/05/21 08:00 





  Valtrex  PO  





  DAILY [facility]  














Discontinued Medications














Generic Name Dose Route Start Last Admin





  Trade Name Freq  PRN Reason Stop Dose Admin


 


Fentanyl  12 mcg  02/04/21 21:30 





  Duragesic  TRDERM  





  Q72H [facility]  














- Re-Assessments/Exams


Free Text/Narrative Re-Assessment/Exam: 





02/04/21 20:00  Discussed case in depth with Dr. Reid and he feels that she 

should be admitted to acute care with pain control of IV fentanyl for break 

through pain, IV NS to correct the hyponatremia, IV protonix, PT to strengthen. 

 Pt is in agreement of this plan and will be admitted.








Departure





- Departure


Time of Disposition: 22:12


Disposition: Admitted As Inpatient 68


Condition: Poor


Clinical Impression: 


 Weakness, Hyponatremia, Inadequate pain control, Failure to thrive in adult, 

Palliative care status





Multiple myeloma


Qualifiers:


 Multiple myeloma remission status: not in remission Qualified Code(s): C90.00 -

 Multiple myeloma not having achieved remission





Back pain


Qualifiers:


 Back pain location: low back pain Chronicity: acute Back pain laterality: 

midline Sciatica presence: without sciatica Qualified Code(s): M54.5 - Low back 

pain








- Discharge Information





Sepsis Event Note (ED)





- Evaluation


Sepsis Screening Result: No Definite Risk





- Focused Exam


Vital Signs: 





                                   Vital Signs











  Temp Pulse Resp BP Pulse Ox


 


 02/04/21 18:45  97.1 F  104 H  18  153/85 H  96














- Problem List & Annotations


(1) Hyponatremia


SNOMED Code(s): 48389553


   Code(s): E87.1 - HYPO-OSMOLALITY AND HYPONATREMIA   Status: Acute   Priority:

 High   Current Visit: Yes   





(2) Multiple myeloma


SNOMED Code(s): 099535780


   Code(s): C90.00 - MULTIPLE MYELOMA NOT HAVING ACHIEVED REMISSION   Status: 

Chronic   Priority: High   Current Visit: Yes   


Qualifiers: 


   Multiple myeloma remission status: not in remission   Qualified Code(s): 

C90.00 - Multiple myeloma not having achieved remission   





(3) Weakness


SNOMED Code(s): 47499671


   Code(s): R53.1 - WEAKNESS   Status: Acute   Priority: High   Current Visit: 

Yes   





(4) Back pain


SNOMED Code(s): 918466183


   Code(s): M54.9 - DORSALGIA, UNSPECIFIED   Status: Acute   Priority: High   

Current Visit: Yes   


Qualifiers: 


   Back pain location: low back pain   Chronicity: acute   Back pain laterality:

 midline   Sciatica presence: without sciatica   Qualified Code(s): M54.5 - Low 

back pain   





(5) Failure to thrive in adult


SNOMED Code(s): 183180922


   Code(s): R62.7 - ADULT FAILURE TO THRIVE   Status: Acute   Priority: High   

Current Visit: Yes   





(6) Hyponatremia


SNOMED Code(s): 45319964


   Code(s): E87.1 - HYPO-OSMOLALITY AND HYPONATREMIA   Status: Acute   Priority:

 High   Current Visit: Yes   





(7) Inadequate pain control


SNOMED Code(s): 909877848


   Code(s): R52 - PAIN, UNSPECIFIED   Status: Acute   Priority: High   Current 

Visit: Yes   





(8) Palliative care status


SNOMED Code(s): 956882407


   Code(s): Z51.5 - ENCOUNTER FOR PALLIATIVE CARE   Status: Chronic   Priority: 

Low   Current Visit: Yes   





- Problem List Review


Problem List Initiated/Reviewed/Updated: Yes





- Assessment/Plan


Admission H&P: Please use this note as an admission H&P


Plan: 





Pt will be admitted to acute care with IV NS to correct her hyponatremia


she will be started on IV fentanyl for break through pain in addition to her tr

ansdermal patch.


PT will be consulted for strengthening. 


IV protonix will be started for GI protection


 to consult to determine if pt is hospice pt or is going to 

continue on treatment and nursing home placement versus possible discharge home 

with family caring  for her.

## 2021-02-05 NOTE — PCM.PN
- General Info


Date of Service: 02/05/21


Subjective Update: 


Sara is an 84 yo female who was admitted from ED yesterday with concerns of 

weakness, hyponatremia and chronic pain. Patient was discharged from the 

hospital yesterday to have home health or hospice care. Patient has known 

history of multiple myeloma. Home health nurse did evaluate patient and when she

arrived yesterday evening the family- daughter and  couldn't decide if 

she should be HH or hospice.  She was having pain that the fentanyl wasn't cont

rolling.  PPt states that she hurts and wanted better pain control.  She states 

that the pain is in her back and hips bilaterally.  She is having trouble 

getting around at home due to the weakness and needs more help.  She denies any 

falls while at home.





2/5/2021


Sara is alert and orientated this morning. States she continue to have 

discomfort and asks if she can get any more medication to help with this. 

Otherwise continues to feel very weak and states she is ready to die. States she

doesn't want any more treatments at this time. States she is tired. 








- Review of Systems


General: Reports: Weakness, Fatigue.  Denies: Fever, Appetite


HEENT: Reports: No Symptoms


Pulmonary: Reports: No Symptoms


Cardiovascular: Reports: No Symptoms


Gastrointestinal: Reports: No Symptoms


Musculoskeletal: Reports: Back Pain, Other


Skin: Reports: No Symptoms


Neurological: Reports: No Symptoms





- Patient Data


Vitals - Most Recent: 


                                Last Vital Signs











Temp  98.6 F   02/05/21 12:17


 


Pulse  110 H  02/05/21 12:17


 


Resp  18   02/05/21 12:17


 


BP  117/69   02/05/21 12:17


 


Pulse Ox  96   02/05/21 12:17











Weight - Most Recent: 145 lb


I&O - Last 24 Hours: 


                                 Intake & Output











 02/04/21 02/05/21 02/05/21





 22:59 06:59 14:59


 


Intake Total  240 980


 


Balance  240 980











Lab Results Last 24 Hours: 


                         Laboratory Results - last 24 hr











  02/04/21 02/04/21 02/04/21 Range/Units





  19:50 19:50 19:50 


 


WBC  8.7    (5.0-10.0)  10^3/uL


 


RBC  3.02 L    (4.00-5.50)  10^6/uL


 


Hgb  9.3 L    (12.0-16.0)  g/dL


 


Hct  28.3 L    (37.0-47.0)  %


 


MCV  93.7    (82.0-94.0)  fL


 


MCH  30.8    (27.0-32.0)  pg


 


MCHC  32.9 L    (33.0-38.0)  g/dL


 


RDW Coeff of Lennox  16.1 H    (11.0-15.0)  %


 


Plt Count  135 L    (150-400)  10^3/uL


 


Neut % (Auto)  80.2    (35-85)  %


 


Lymph % (Auto)  9.3 L    (10-55)  %


 


Mono % (Auto)  8.0    (0-16)  %


 


Eos % (Auto)  2.3    (0-5)  %


 


Baso % (Auto)  0.2    (0-3)  %


 


Neut # (Auto)  7.00    (1.80-7.00)  10^3/uL


 


Lymph # (Auto)  0.81 L    (1.00-4.80)  10^3/uL


 


Mono # (Auto)  0.70    (0.00-0.80)  10^3/uL


 


Eos # (Auto)  0.20    (0.00-0.45)  10^3/uL


 


Baso # (Auto)  0.02    10^3/uL


 


Sodium   133 L   (136-145)  mEq/L


 


Potassium   4.0   (3.5-5.0)  mEq/L


 


Chloride   100   ()  mEq/L


 


Carbon Dioxide   23   (21-32)  mmol/L


 


BUN   17   (7-18)  mg/dL


 


Creatinine   1.0   (0.6-1.0)  mg/dL


 


Est Cr Clr Drug Dosing   38.36   mL/min


 


Estimated GFR (MDRD)   53 L   (>=60)  mL/min


 


Glucose   109 H   (75-99)  mg/dL


 


Lactic Acid    1.4  (0.4-2.0)  mmol/L


 


Calcium   8.7   (8.4-10.1)  mg/dL


 


Total Bilirubin   0.7   (0.0-1.0)  mg/dL


 


AST   106 H   (15-37)  U/L


 


ALT   92 H   (12-78)  U/L


 


Alkaline Phosphatase   247 H   ()  U/L


 


C-Reactive Protein   8.9 H   (0.2-0.8)  mg/dL


 


Total Protein   6.5   (6.4-8.2)  g/dL


 


Albumin   2.4 L   (3.4-5.0)  g/dL














  02/05/21 Range/Units





  05:11 


 


WBC   (5.0-10.0)  10^3/uL


 


RBC   (4.00-5.50)  10^6/uL


 


Hgb   (12.0-16.0)  g/dL


 


Hct   (37.0-47.0)  %


 


MCV   (82.0-94.0)  fL


 


MCH   (27.0-32.0)  pg


 


MCHC   (33.0-38.0)  g/dL


 


RDW Coeff of Lennox   (11.0-15.0)  %


 


Plt Count   (150-400)  10^3/uL


 


Neut % (Auto)   (35-85)  %


 


Lymph % (Auto)   (10-55)  %


 


Mono % (Auto)   (0-16)  %


 


Eos % (Auto)   (0-5)  %


 


Baso % (Auto)   (0-3)  %


 


Neut # (Auto)   (1.80-7.00)  10^3/uL


 


Lymph # (Auto)   (1.00-4.80)  10^3/uL


 


Mono # (Auto)   (0.00-0.80)  10^3/uL


 


Eos # (Auto)   (0.00-0.45)  10^3/uL


 


Baso # (Auto)   10^3/uL


 


Sodium  134 L  (136-145)  mEq/L


 


Potassium  3.5  (3.5-5.0)  mEq/L


 


Chloride  103  ()  mEq/L


 


Carbon Dioxide  22  (21-32)  mmol/L


 


BUN  17  (7-18)  mg/dL


 


Creatinine  0.8  (0.6-1.0)  mg/dL


 


Est Cr Clr Drug Dosing  47.95  mL/min


 


Estimated GFR (MDRD)  > 60  (>=60)  mL/min


 


Glucose  109 H  (75-99)  mg/dL


 


Lactic Acid   (0.4-2.0)  mmol/L


 


Calcium  8.5  (8.4-10.1)  mg/dL


 


Total Bilirubin   (0.0-1.0)  mg/dL


 


AST   (15-37)  U/L


 


ALT   (12-78)  U/L


 


Alkaline Phosphatase   ()  U/L


 


C-Reactive Protein   (0.2-0.8)  mg/dL


 


Total Protein   (6.4-8.2)  g/dL


 


Albumin   (3.4-5.0)  g/dL











Med Orders - Current: 


                               Current Medications





Atorvastatin Calcium (Lipitor)  80 mg PO BEDTIME [facility]


Bisacodyl (Dulcolax)  5 mg PO BEDTIME PRN


   PRN Reason: Constipation


Celecoxib (Celebrex)  100 mg PO DAILY [facility]


   Last Admin: 02/05/21 08:31 Dose:  100 mg


   Documented by: 


Docusate Sodium (Colace)  100 mg PO BID [facility]


   Last Admin: 02/05/21 08:32 Dose:  100 mg


   Documented by: 


Enoxaparin Sodium (Lovenox)  40 mg SUBCUT BEDTIME [facility]


   Last Admin: 02/04/21 22:14 Dose:  40 mg


   Documented by: 


Fentanyl (Duragesic)  12 mcg TRDERM Q72H [facility]


   Last Admin: 02/05/21 08:41 Dose:  12 mcg


   Documented by: 


Fentanyl (Fentanyl)  12.5 - 25 mcg IVPUSH Q6H PRN


   PRN Reason: Breakthrough Pain


Furosemide (Lasix)  40 mg PO DAILY [facility]


   Last Admin: 02/05/21 08:32 Dose:  40 mg


   Documented by: 


Sodium Chloride (Normal Saline)  1,000 mls @ 75 mls/hr IV ASDIRECTED [facility]


   Last Admin: 02/05/21 11:15 Dose:  75 mls/hr


   Documented by: 


Isosorbide Mononitrate (Imdur)  30 mg PO DAILY [facility]


   Last Admin: 02/05/21 08:32 Dose:  30 mg


   Documented by: 


Levothyroxine Sodium (Synthroid)  50 mcg PO DAILY [facility]


   Last Admin: 02/05/21 08:32 Dose:  50 mcg


   Documented by: 


Metoprolol Tartrate (Lopressor)  25 mg PO BID [facility]


   Last Admin: 02/05/21 08:32 Dose:  25 mg


   Documented by: 


Oxybutynin Chloride (Oxybutynin)  10 mg PO BID [facility]


   Last Admin: 02/05/21 08:32 Dose:  10 mg


   Documented by: 


Pantoprazole Sodium (Protonix Iv***)  40 mg IVPUSH Q12H [facility]


   Last Admin: 02/05/21 08:30 Dose:  40 mg


   Documented by: 


Polyethylene Glycol (Miralax)  17 gm PO DAILY PRN


   PRN Reason: Constipation


Potassium Chloride (Klor-Con 10)  20 meq PO BID [facility]


   Last Admin: 02/05/21 08:31 Dose:  20 meq


   Documented by: 


Tramadol HCl (Ultram)  50 mg PO Q6H PRN


   PRN Reason: Pain


   Last Admin: 02/05/21 02:09 Dose:  50 mg


   Documented by: 


Valacyclovir HCl (Valtrex)  500 mg PO DAILY [facility]


   Last Admin: 02/05/21 08:32 Dose:  500 mg


   Documented by: 





Discontinued Medications





Fentanyl (Fentanyl)  12.5 mcg IVPUSH Q6H PRN


   PRN Reason: Breakthrough Pain


   Last Admin: 02/05/21 08:35 Dose:  12.5 mcg


   Documented by: 


Fentanyl (Duragesic)  12 mcg TRDERM Q72H [facility]


   Last Admin: 02/04/21 22:24 Dose:  Not Given


   Documented by: 











- Exam


General: Alert, Oriented, Cooperative


Lungs: Clear to Auscultation, Normal Respiratory Effort


Cardiovascular: Regular Rate, Regular Rhythm


GI/Abdominal Exam: Normal Bowel Sounds, Soft, No Distention


Extremities: Pedal Edema (trace bilateral)


Skin: Warm, Dry, Intact


Psy/Mental Status: Alert, Normal Affect, Normal Mood





Sepsis Event Note





- Evaluation


Sepsis Screening Result: No Definite Risk





- Focused Exam


Vital Signs: 


                                   Vital Signs











  Temp Pulse Pulse Resp BP BP Pulse Ox


 


 02/05/21 12:17  98.6 F   110 H  18   117/69  96


 


 02/05/21 08:32   91    112/55 L  


 


 02/05/21 08:00  97.4 F   91  18   112/55 L  97














- Problem List & Annotations


(1) Back pain


SNOMED Code(s): 935035838


   Code(s): M54.9 - DORSALGIA, UNSPECIFIED   Status: Acute   Priority: High   

Current Visit: Yes   


Qualifiers: 


   Back pain location: low back pain   Chronicity: acute   Back pain laterality:

midline   Sciatica presence: without sciatica   Qualified Code(s): M54.5 - Low 

back pain   





(2) Hyponatremia


SNOMED Code(s): 50286120


   Code(s): E87.1 - HYPO-OSMOLALITY AND HYPONATREMIA   Status: Chronic   

Priority: High   Current Visit: Yes   





(3) Inadequate pain control


SNOMED Code(s): 338304834


   Code(s): R52 - PAIN, UNSPECIFIED   Status: Acute   Priority: High   Current 

Visit: Yes   





(4) Weakness


SNOMED Code(s): 52316656


   Code(s): R53.1 - WEAKNESS   Status: Acute   Priority: High   Current Visit: 

Yes   





(5) Multiple myeloma


SNOMED Code(s): 064651525


   Code(s): C90.00 - MULTIPLE MYELOMA NOT HAVING ACHIEVED REMISSION   Status: 

Chronic   Priority: High   Current Visit: Yes   


Qualifiers: 


   Multiple myeloma remission status: not in remission   Qualified Code(s): 

C90.00 - Multiple myeloma not having achieved remission   





(6) Palliative care status


SNOMED Code(s): 618293673


   Code(s): Z51.5 - ENCOUNTER FOR PALLIATIVE CARE   Status: Chronic   Priority: 

Low   Current Visit: Yes   





- Problem List Review


Problem List Initiated/Reviewed/Updated: Yes





- My Orders


Last 24 Hours: 


My Active Orders





02/05/21 12:53


fentaNYL   12.5 - 25 mcg IVPUSH Q6H PRN 














- Plan


Plan:: 


Will continue to titrate pain medication. Discussed with Sara it may make her 

sleepy, which she states would be okay. Sodium stable today. Family will be 

present this weekend and will decide on Hospice care.

## 2021-02-06 NOTE — PCM.PN
- General Info


Date of Service: 02/06/21


Admission Dx/Problem (Free Text): 





Multiple Myeloma


Pain control


Weakness





Subjective Update: 


Sara is an 86 yo female who was admitted from ED yesterday with concerns of 

weakness, hyponatremia and chronic pain. Patient was discharged from the 

hospital yesterday to have home health or hospice care. Patient has known 

history of multiple myeloma. Home health nurse did evaluate patient and when she

arrived yesterday evening the family- daughter and  couldn't decide if 

she should be HH or hospice.  She was having pain that the fentanyl wasn't 

controlling.  PPt states that she hurts and wanted better pain control.  She 

states that the pain is in her back and hips bilaterally.  She is having trouble

getting around at home due to the weakness and needs more help.  She denies any 

falls while at home.





2/5/2021


Sara is alert and orientated this morning. States she continue to have 

discomfort and asks if she can get any more medication to help with this. 

Otherwise continues to feel very weak and states she is ready to die. States she

doesn't want any more treatments at this time. States she is tired. 





2-6-2021


Patient is resting comfortably at this time, does state that has pain all over 

all the time.  Does not feel medications help long enough.  Still weak.  

Expresses desire to "just be done and die".  Does moan continuously in room 

despite pain meds given.  Appetite has been good, patient complains that "they 

push and push and make me eat", nurse reports she has good appetite.  No chest 

pain or shortness of breath.  Denies any nausea.


Functional Status: Reports: Tolerating Diet, Urinating.  Denies: Pain 

Controlled, Ambulating





- Review of Systems


General: Reports: Weakness, Fatigue, Malaise.  Denies: Fever


HEENT: Denies: Ear Pain, Sore Throat


Pulmonary: Denies: Shortness of Breath, Cough


Cardiovascular: Denies: Chest Pain, Edema, Lightheadedness


Gastrointestinal: Denies: Abdominal Pain, Decreased Appetite, Nausea, Vomiting


Genitourinary: Reports: Incontinence


Musculoskeletal: Reports: Other (states has pain all over in her bones)


Skin: Reports: No Symptoms


Neurological: Reports: Weakness





- Patient Data


Vitals - Most Recent: 


                                Last Vital Signs











Temp  98.4 F   02/06/21 07:48


 


Pulse  87   02/06/21 07:48


 


Resp  20   02/06/21 07:48


 


BP  146/78 H  02/06/21 07:48


 


Pulse Ox  99   02/06/21 07:48











Weight - Most Recent: 148 lb 3.2 oz


I&O - Last 24 Hours: 


                                 Intake & Output











 02/05/21 02/06/21 02/06/21





 22:59 06:59 14:59


 


Intake Total 1109 100 


 


Balance 1109 100 











Med Orders - Current: 


                               Current Medications





Atorvastatin Calcium (Lipitor)  80 mg PO BEDTIME [facility]


   Last Admin: 02/06/21 04:03 Dose:  Not Given


   Documented by: 


Bisacodyl (Dulcolax)  5 mg PO BEDTIME PRN


   PRN Reason: Constipation


Celecoxib (Celebrex)  100 mg PO DAILY [facility]


   Last Admin: 02/06/21 07:46 Dose:  100 mg


   Documented by: 


Docusate Sodium (Colace)  100 mg PO BID [facility]


   Last Admin: 02/06/21 07:46 Dose:  100 mg


   Documented by: 


Enoxaparin Sodium (Lovenox)  40 mg SUBCUT BEDTIME [facility]


   Last Admin: 02/05/21 19:22 Dose:  40 mg


   Documented by: 


Fentanyl (Duragesic)  12 mcg TRDERM Q72H [facility]


   Last Admin: 02/05/21 08:41 Dose:  12 mcg


   Documented by: 


Fentanyl (Fentanyl)  12.5 - 25 mcg IVPUSH Q6H PRN


   PRN Reason: Breakthrough Pain


   Last Admin: 02/06/21 04:02 Dose:  25 mcg


   Documented by: 


Furosemide (Lasix)  40 mg PO DAILY [facility]


   Last Admin: 02/06/21 07:46 Dose:  40 mg


   Documented by: 


Sodium Chloride (Normal Saline)  1,000 mls @ 75 mls/hr IV ASDIRECTED [facility]


   Last Admin: 02/05/21 19:22 Dose:  75 mls/hr


   Documented by: 


Isosorbide Mononitrate (Imdur)  30 mg PO DAILY [facility]


   Last Admin: 02/06/21 07:46 Dose:  30 mg


   Documented by: 


Levothyroxine Sodium (Synthroid)  50 mcg PO DAILY [facility]


   Last Admin: 02/06/21 07:45 Dose:  50 mcg


   Documented by: 


Metoprolol Tartrate (Lopressor)  25 mg PO BID [facility]


   Last Admin: 02/06/21 07:46 Dose:  25 mg


   Documented by: 


Miscellaneous Information (Remove Patch)  1 ea TRDERM Q72H [facility]


Oxybutynin Chloride (Oxybutynin)  10 mg PO BID [facility]


   Last Admin: 02/06/21 07:46 Dose:  10 mg


   Documented by: 


Pantoprazole Sodium (Protonix Iv***)  40 mg IVPUSH Q12H [facility]


   Last Admin: 02/06/21 08:36 Dose:  40 mg


   Documented by: 


Polyethylene Glycol (Miralax)  17 gm PO DAILY PRN


   PRN Reason: Constipation


Potassium Chloride (Klor-Con 10)  20 meq PO BID [facility]


   Last Admin: 02/06/21 07:46 Dose:  20 meq


   Documented by: 


Tramadol HCl (Ultram)  50 mg PO Q6H PRN


   PRN Reason: Pain


   Last Admin: 02/06/21 07:50 Dose:  50 mg


   Documented by: 


Valacyclovir HCl (Valtrex)  500 mg PO DAILY [facility]


   Last Admin: 02/06/21 07:45 Dose:  500 mg


   Documented by: 





Discontinued Medications





Fentanyl (Fentanyl)  12.5 mcg IVPUSH Q6H PRN


   PRN Reason: Breakthrough Pain


   Last Admin: 02/05/21 08:35 Dose:  12.5 mcg


   Documented by: 


Fentanyl (Duragesic)  12 mcg TRDERM Q72H [facility]


   Last Admin: 02/04/21 22:24 Dose:  Not Given


   Documented by: 











- Exam


General: Alert, Oriented, Cooperative


HEENT: Mucous Membr. Moist/Pink


Neck: Supple


Lungs: Clear to Auscultation, Normal Respiratory Effort


Cardiovascular: Regular Rate, Regular Rhythm


GI/Abdominal Exam: Normal Bowel Sounds, Soft, Non-Tender


Extremities: Normal Inspection, No Pedal Edema


Skin: Warm, Dry


Neurological: No New Focal Deficit





Sepsis Event Note





- Evaluation


Sepsis Screening Result: No Definite Risk





- Focused Exam


Vital Signs: 


                                   Vital Signs











  Temp Pulse Pulse Resp BP BP Pulse Ox


 


 02/06/21 07:48  98.4 F   87  20   146/78 H  99


 


 02/06/21 07:46   87    146/78 H  


 


 02/06/21 00:00  99 F   84  18   126/52 L  96














- Problem List & Annotations


(1) Failure to thrive in adult


SNOMED Code(s): 695188229


   Code(s): R62.7 - ADULT FAILURE TO THRIVE   Status: Acute   Priority: High   

Current Visit: Yes   





(2) Weakness


SNOMED Code(s): 82099152


   Code(s): R53.1 - WEAKNESS   Status: Acute   Priority: High   Current Visit: 

Yes   





(3) Multiple myeloma


SNOMED Code(s): 056199519


   Code(s): C90.00 - MULTIPLE MYELOMA NOT HAVING ACHIEVED REMISSION   Status: 

Chronic   Priority: High   Current Visit: Yes   


Qualifiers: 


   Multiple myeloma remission status: not in remission   Qualified Code(s): 

C90.00 - Multiple myeloma not having achieved remission   





(4) Palliative care status


SNOMED Code(s): 606332888


   Code(s): Z51.5 - ENCOUNTER FOR PALLIATIVE CARE   Status: Chronic   Priority: 

Medium   Current Visit: Yes   





- Problem List Review


Problem List Initiated/Reviewed/Updated: Yes





- Assessment


Assessment:: 





Failure to thrive


Weakness


Multiple Myeloma


Palliative Care patient





- Plan


Plan:: 


Will continue to titrate pain medication. Discussed with Sara it may make her 

sleepy, which she states would be okay. Sodium stable today. Family will be 

present this weekend and will decide on Hospice care. 





2-6-2021


Will continue with current IV pain medication as staff states she does rest 

well, sleeps and is eating.  Labs stable.  Will continue with present care, 

await family decision on home with hospice on Monday.

## 2021-02-07 NOTE — PCM.PN
- General Info


Date of Service: 02/07/21


Admission Dx/Problem (Free Text): 





Multiple Myeloma


Pain control


Weakness





Subjective Update: 


Sara is an 84 yo female who was admitted from ED yesterday with concerns of 

weakness, hyponatremia and chronic pain. Patient was discharged from the 

hospital yesterday to have home health or hospice care. Patient has known 

history of multiple myeloma. Home health nurse did evaluate patient and when she

arrived yesterday evening the family- daughter and  couldn't decide if 

she should be HH or hospice.  She was having pain that the fentanyl wasn't 

controlling.  PPt states that she hurts and wanted better pain control.  She 

states that the pain is in her back and hips bilaterally.  She is having trouble

getting around at home due to the weakness and needs more help.  She denies any 

falls while at home.





2/5/2021


Sara is alert and orientated this morning. States she continue to have 

discomfort and asks if she can get any more medication to help with this. 

Otherwise continues to feel very weak and states she is ready to die. States she

doesn't want any more treatments at this time. States she is tired. 





2-6-2021


Patient is resting comfortably at this time, does state that has pain all over 

all the time.  Does not feel medications help long enough.  Still weak.  

Expresses desire to "just be done and die".  Does moan continuously in room 

despite pain meds given.  Appetite has been good, patient complains that "they 

push and push and make me eat", nurse reports she has good appetite.  No chest 

pain or shortness of breath.  Denies any nausea.





2-7-2021


Patient is lethargic this am but does arouse to verbal command.  States has pain

all over from her cancer.  "just wants a pill to help end it".  Did have 50% of 

breakfast this am, does feed self.  Denies shortness of breath or chest pain.  

Still requiring PRN pain meds.  Nurse did rub biofreeze on joints today.  Very 

weak.


Functional Status: Reports: Tolerating Diet.  Denies: Pain Controlled, 

Ambulating





- Review of Systems


General: Reports: Weakness, Fatigue, Malaise


HEENT: Reports: No Symptoms


Pulmonary: Denies: Shortness of Breath, Cough


Cardiovascular: Denies: Chest Pain, Edema, Lightheadedness


Gastrointestinal: Denies: Abdominal Pain, Nausea, Vomiting


Genitourinary: Reports: Incontinence


Musculoskeletal: Reports: Joint Pain


Skin: Reports: No Symptoms


Neurological: Reports: Weakness





- Patient Data


Vitals - Most Recent: 


                                Last Vital Signs











Temp  97.8 F   02/07/21 07:44


 


Pulse  84   02/07/21 07:44


 


Resp  16   02/07/21 07:44


 


BP  145/64 H  02/07/21 07:44


 


Pulse Ox  96   02/07/21 07:44











Weight - Most Recent: 149 lb 9.6 oz


I&O - Last 24 Hours: 


                                 Intake & Output











 02/06/21 02/07/21 02/07/21





 22:59 06:59 14:59


 


Intake Total 1209 300 


 


Balance 1209 300 











Med Orders - Current: 


                               Current Medications





Atorvastatin Calcium (Lipitor)  80 mg PO BEDTIME [facility]


   Last Admin: 02/06/21 19:17 Dose:  80 mg


   Documented by: 


Bisacodyl (Dulcolax)  5 mg PO BEDTIME PRN


   PRN Reason: Constipation


Celecoxib (Celebrex)  100 mg PO DAILY [facility]


   Last Admin: 02/07/21 07:35 Dose:  100 mg


   Documented by: 


Docusate Sodium (Colace)  100 mg PO BID [facility]


   Last Admin: 02/07/21 07:32 Dose:  100 mg


   Documented by: 


Enoxaparin Sodium (Lovenox)  40 mg SUBCUT BEDTIME [facility]


   Last Admin: 02/06/21 19:17 Dose:  40 mg


   Documented by: 


Fentanyl (Duragesic)  12 mcg TRDERM Q72H [facility]


   Last Admin: 02/05/21 08:41 Dose:  12 mcg


   Documented by: 


Fentanyl (Fentanyl)  12.5 - 25 mcg IVPUSH Q6H PRN


   PRN Reason: Breakthrough Pain


   Last Admin: 02/07/21 07:49 Dose:  25 mcg


   Documented by: 


Furosemide (Lasix)  40 mg PO DAILY [facility]


   Last Admin: 02/07/21 07:33 Dose:  40 mg


   Documented by: 


Sodium Chloride (Normal Saline)  1,000 mls @ 75 mls/hr IV ASDIRECTED [facility]


   Last Admin: 02/06/21 19:17 Dose:  75 mls/hr


   Documented by: 


Isosorbide Mononitrate (Imdur)  30 mg PO DAILY [facility]


   Last Admin: 02/07/21 07:35 Dose:  30 mg


   Documented by: 


Levothyroxine Sodium (Synthroid)  50 mcg PO DAILY [facility]


   Last Admin: 02/07/21 07:32 Dose:  50 mcg


   Documented by: 


Metoprolol Tartrate (Lopressor)  25 mg PO BID [facility]


   Last Admin: 02/07/21 07:33 Dose:  25 mg


   Documented by: 


Miscellaneous Information (Remove Patch)  1 ea TRDERM Q72H [facility]


Oxybutynin Chloride (Oxybutynin)  10 mg PO BID [facility]


   Last Admin: 02/07/21 07:34 Dose:  10 mg


   Documented by: 


Pantoprazole Sodium (Protonix Iv***)  40 mg IVPUSH Q12H [facility]


   Last Admin: 02/07/21 09:05 Dose:  40 mg


   Documented by: 


Polyethylene Glycol (Miralax)  17 gm PO DAILY PRN


   PRN Reason: Constipation


Potassium Chloride (Klor-Con 10)  20 meq PO BID [facility]


   Last Admin: 02/07/21 07:35 Dose:  20 meq


   Documented by: 


Tramadol HCl (Ultram)  50 mg PO Q6H PRN


   PRN Reason: Pain


   Last Admin: 02/06/21 16:36 Dose:  50 mg


   Documented by: 


Valacyclovir HCl (Valtrex)  500 mg PO DAILY [facility]


   Last Admin: 02/07/21 07:34 Dose:  500 mg


   Documented by: 





Discontinued Medications





Fentanyl (Fentanyl)  12.5 mcg IVPUSH Q6H PRN


   PRN Reason: Breakthrough Pain


   Last Admin: 02/05/21 08:35 Dose:  12.5 mcg


   Documented by: 


Fentanyl (Duragesic)  12 mcg TRDERM Q72H [facility]


   Last Admin: 02/04/21 22:24 Dose:  Not Given


   Documented by: 











- Exam


General: Alert, Oriented


HEENT: Other (mucous membranes dry)


Neck: Supple


Lungs: Clear to Auscultation, Normal Respiratory Effort


Cardiovascular: Regular Rate, Regular Rhythm


GI/Abdominal Exam: Normal Bowel Sounds, Soft, Non-Tender


Extremities: Normal Inspection, Pedal Edema (trace), Other (right arm is weak 

but is moving today, does not grasp with hand when asked)


Skin: Warm, Dry


Neurological: No New Focal Deficit





Sepsis Event Note





- Evaluation


Sepsis Screening Result: No Definite Risk





- Focused Exam


Vital Signs: 


                                   Vital Signs











  Temp Pulse Pulse Resp BP BP Pulse Ox


 


 02/07/21 07:44  97.8 F   84  16   145/64 H  96


 


 02/07/21 07:35      145/64 H  


 


 02/07/21 07:33   84    145/64 H  


 


 02/07/21 00:00  97 F   78  18   122/56 L  97














- Problem List & Annotations


(1) Failure to thrive in adult


SNOMED Code(s): 602206952


   Code(s): R62.7 - ADULT FAILURE TO THRIVE   Status: Acute   Priority: High   

Current Visit: Yes   





(2) Weakness


SNOMED Code(s): 84377587


   Code(s): R53.1 - WEAKNESS   Status: Acute   Priority: High   Current Visit: 

Yes   





(3) Multiple myeloma


SNOMED Code(s): 991202757


   Code(s): C90.00 - MULTIPLE MYELOMA NOT HAVING ACHIEVED REMISSION   Status: 

Chronic   Priority: High   Current Visit: Yes   


Qualifiers: 


   Multiple myeloma remission status: not in remission   Qualified Code(s): 

C90.00 - Multiple myeloma not having achieved remission   





(4) Palliative care status


SNOMED Code(s): 955042899


   Code(s): Z51.5 - ENCOUNTER FOR PALLIATIVE CARE   Status: Chronic   Priority: 

Medium   Current Visit: Yes   





- Problem List Review


Problem List Initiated/Reviewed/Updated: Yes





- Assessment


Assessment:: 





Failure to thrive


Weakness


Multiple Myeloma


Palliative Care patient





- Plan


Plan:: 


Will continue to titrate pain medication. Discussed with Sara it may make her 

sleepy, which she states would be okay. Sodium stable today. Family will be 

present this weekend and will decide on Hospice care. 





2-6-2021


Will continue with current IV pain medication as staff states she does rest 

well, sleeps and is eating.  Labs stable.  Will continue with present care, 

await family decision on home with hospice on Monday.





2-7-2021


Was called in to assess patient yesterday afternoon due to right arm weakness.  

Staff had noted that she right arm appeared limp, would not grasp with hand or 

move.  Had been complaining of pain throughout the stay here.  Patient continue 

to have flaccidity of arm with my exam but noted that she will reach out when 

nurses turn her.  Did contact family to inquire about investigation of this, ie.

stroke work up.  Did have a CT scan on Monday prior.  Family relates that would 

not like any further testing as likely will be switched to hospice tomorrow.  

Relates that has had intermittent weakness of her right arm and they have 

suspected TIAs in the past.  





At this point, will continue with IV fluids.  PRN pain meds.  Follow up with 

 tomorrow.

## 2021-02-08 NOTE — PCM.DCSUM1
**Discharge Summary





- Hospital Course


HPI Initial Comments: 


Sara is an 86 yo female who was admitted from ED on 2/4/2021 with concerns of 

weakness, hyponatremia and chronic pain. Patient was discharged from the 

hospital the day prior to have home health or hospice care. Patient has known 

history of multiple myeloma. Home health nurse did evaluate patient and when she

arrived on that evening the family- daughter and  couldn't decide if she 

should be HH or hospice.  She was having pain that the fentanyl wasn't 

controlling.  Pt states that she hurts and wanted better pain control.  She 

states that the pain is in her back and hips bilaterally.  She is having trouble

getting around at home due to the weakness and needs more help.  She denies any 

falls while at home.











- Discharge Data


Discharge Date: 02/08/21


Discharge Disposition: Home, W [facility] Health [facility]


Condition: Good





- Referral to Home Health


Date of Face to Face Encounter: 02/08/21


Reason for Homebound Status: End stage multiple myeloma with comfort measures


Primary Care Physician: 


Armaan Reid MD





Skilled Need: Hospice for pain control and end of life care.





- Discharge Diagnosis/Problem(s)


(1) Back pain


SNOMED Code(s): 746928643


   ICD Code: M54.9 - DORSALGIA, UNSPECIFIED   Status: Acute   Priority: High   

Current Visit: Yes   


Qualifiers: 


   Back pain location: low back pain   Chronicity: acute   Back pain laterality:

midline   Sciatica presence: without sciatica   Qualified Code(s): M54.5 - Low 

back pain   





(2) Hyponatremia


SNOMED Code(s): 90018736


   ICD Code: E87.1 - HYPO-OSMOLALITY AND HYPONATREMIA   Status: Chronic   

Priority: High   Current Visit: Yes   





(3) Inadequate pain control


SNOMED Code(s): 786346654


   ICD Code: R52 - PAIN, UNSPECIFIED   Status: Acute   Priority: High   Current 

Visit: Yes   





(4) Weakness


SNOMED Code(s): 50340758


   ICD Code: R53.1 - WEAKNESS   Status: Acute   Priority: High   Current Visit: 

Yes   





(5) Multiple myeloma


SNOMED Code(s): 615338811


   ICD Code: C90.00 - MULTIPLE MYELOMA NOT HAVING ACHIEVED REMISSION   Status: 

Chronic   Priority: High   Current Visit: Yes   


Qualifiers: 


   Multiple myeloma remission status: not in remission   Qualified Code(s): 

C90.00 - Multiple myeloma not having achieved remission   





(6) Palliative care status


SNOMED Code(s): 493581636


   ICD Code: Z51.5 - ENCOUNTER FOR PALLIATIVE CARE   Status: Chronic   Priority:

Medium   Current Visit: Yes   





- Patient Summary/Data


Consults: 


                                  Consultations





02/04/21 21:21


PT Evaluation and Treatment [CONS] Routine 














- Patient Instructions


Diet: Usual Diet as Tolerated


Activity: As Tolerated





- Discharge Plan


Home Medications: 


                                    Home Meds





Metoprolol Tartrate [Lopressor] 1 tab PO BID 06/28/15 [History]


Omeprazole 20 mg PO DAILY 06/28/15 [History]


traMADol HCl [Tramadol HCl] 1 tab PO BID 06/28/15 [History]


Levothyroxine Sodium [Levoxyl] 50 mcg PO DAILY 04/23/18 [History]


fentaNYL [Duragesic] 25 mcg TRDERM Q72H #10 patch 02/08/21 [Rx]








Forms:  ED Department Discharge





- Discharge Summary/Plan Comment


DC Time >30 min.: Yes


Discharge Summary/Plan Comment: 


Discussed Sara's current status with family today. Plan for discharge and will 

take home with Hospice to resume care. Patient admits this is her wishes at this

time and wants the pain to stop and to just rest. Family will take patient home 

today. Pain medications to continue until Hospice evaluates. 





- General Info


Subjective Update: 


Sara is an 86 yo female who was admitted from ED yesterday with concerns of 

weakness, hyponatremia and chronic pain. Patient was discharged from the 

hospital yesterday to have home health or hospice care. Patient has known 

history of multiple myeloma. Home health nurse did evaluate patient and when she

arrived yesterday evening the family- daughter and  couldn't decide if 

she should be HH or hospice.  She was having pain that the fentanyl wasn't 

controlling.  PPt states that she hurts and wanted better pain control.  She 

states that the pain is in her back and hips bilaterally.  She is having trouble

getting around at home due to the weakness and needs more help.  She denies any 

falls while at home.





2/5/2021


Sara is alert and orientated this morning. States she continue to have 

discomfort and asks if she can get any more medication to help with this. 

Otherwise continues to feel very weak and states she is ready to die. States she

doesn't want any more treatments at this time. States she is tired. 





2-6-2021


Patient is resting comfortably at this time, does state that has pain all over 

all the time.  Does not feel medications help long enough.  Still weak.  

Expresses desire to "just be done and die".  Does moan continuously in room 

despite pain meds given.  Appetite has been good, patient complains that "they 

push and push and make me eat", nurse reports she has good appetite.  No chest 

pain or shortness of breath.  Denies any nausea.





2-7-2021


Patient is lethargic this am but does arouse to verbal command.  States has pain

all over from her cancer.  "just wants a pill to help end it".  Did have 50% of 

breakfast this am, does feed self.  Denies shortness of breath or chest pain.  

Still requiring PRN pain meds.  Nurse did rub biofreeze on joints today.  Very 

weak.





2-8-2021


Patient continues to be weak today and complaining of discomfort. Was eating 

breakfast this morning. Right arm continues to have weakness but is able to 

squeeze fingers. She admits she is tired and just wants to sleep. Is orientated 

X 3. 


Functional Status: Denies: Pain Controlled





- Review of Systems


General: Reports: Weakness, Fatigue


Pulmonary: Reports: No Symptoms


Cardiovascular: Reports: No Symptoms


Gastrointestinal: Reports: No Symptoms


Genitourinary: Reports: No Symptoms


Musculoskeletal: Reports: Back Pain, Other (bone pain)


Skin: Reports: No Symptoms


Neurological: Reports: No Symptoms





- Patient Data


Vitals - Most Recent: 


                                Last Vital Signs











Temp  98.0 F   02/08/21 07:41


 


Pulse  107 H  02/08/21 07:53


 


Resp  18   02/08/21 07:41


 


BP  169/78 H  02/08/21 07:53


 


Pulse Ox  97   02/08/21 07:41











Weight - Most Recent: 152 lb 1.6 oz


I&O - Last 24 hours: 


                                 Intake & Output











 02/07/21 02/08/21 02/08/21





 22:59 06:59 14:59


 


Intake Total 500 578 


 


Balance 500 578 











Med Orders - Current: 


                               Current Medications





Atorvastatin Calcium (Lipitor)  80 mg PO BEDTIME [facility]


   Last Admin: 02/07/21 22:48 Dose:  Not Given


   Documented by: 


Bisacodyl (Dulcolax)  5 mg PO BEDTIME PRN


   PRN Reason: Constipation


Celecoxib (Celebrex)  100 mg PO DAILY [facility]


   Last Admin: 02/08/21 07:53 Dose:  100 mg


   Documented by: 


Docusate Sodium (Colace)  100 mg PO BID [facility]


   Last Admin: 02/08/21 07:50 Dose:  100 mg


   Documented by: 


Enoxaparin Sodium (Lovenox)  40 mg SUBCUT BEDTIME [facility]


   Last Admin: 02/07/21 22:49 Dose:  Not Given


   Documented by: 


Fentanyl (Duragesic)  25 mcg TRDERM Q72H [facility]


   Last Admin: 02/08/21 08:50 Dose:  25 mcg


   Documented by: 


Fentanyl (Fentanyl)  12.5 mcg IVPUSH Q2H PRN


   PRN Reason: Breakthrough Pain


   Last Admin: 02/08/21 06:12 Dose:  12.5 mcg


   Documented by: 


Fentanyl (Fentanyl)  25 mcg IVPUSH Q4H PRN


   PRN Reason: Breakthrough Pain


   Last Admin: 02/08/21 04:08 Dose:  25 mcg


   Documented by: 


Furosemide (Lasix)  40 mg PO DAILY [facility]


   Last Admin: 02/08/21 07:52 Dose:  40 mg


   Documented by: 


Sodium Chloride (Normal Saline)  1,000 mls @ 75 mls/hr IV ASDIRECTED [facility]


   Last Admin: 02/07/21 23:15 Dose:  75 mls/hr


   Documented by: 


Isosorbide Mononitrate (Imdur)  30 mg PO DAILY [facility]


   Last Admin: 02/08/21 07:51 Dose:  30 mg


   Documented by: 


Levothyroxine Sodium (Synthroid)  50 mcg PO DAILY [facility]


   Last Admin: 02/08/21 07:52 Dose:  50 mcg


   Documented by: 


Metoprolol Tartrate (Lopressor)  25 mg PO BID [facility]


   Last Admin: 02/08/21 07:53 Dose:  25 mg


   Documented by: 


Miscellaneous Information (Remove Patch)  1 ea TRDERM Q72H [facility]


   Last Admin: 02/08/21 08:51 Dose:  Not Given


   Documented by: 


Oxybutynin Chloride (Oxybutynin)  10 mg PO BID [facility]


   Last Admin: 02/08/21 07:52 Dose:  10 mg


   Documented by: 


Pantoprazole Sodium (Protonix Iv***)  40 mg IVPUSH Q12H [facility]


   Last Admin: 02/08/21 08:50 Dose:  40 mg


   Documented by: 


Polyethylene Glycol (Miralax)  17 gm PO DAILY PRN


   PRN Reason: Constipation


Potassium Chloride (Klor-Con 10)  20 meq PO BID [facility]


   Last Admin: 02/08/21 07:50 Dose:  20 meq


   Documented by: 


Tramadol HCl (Ultram)  50 mg PO Q6H PRN


   PRN Reason: Pain


   Last Admin: 02/07/21 16:39 Dose:  50 mg


   Documented by: 


Valacyclovir HCl (Valtrex)  500 mg PO DAILY [facility]


   Last Admin: 02/08/21 07:52 Dose:  500 mg


   Documented by: 





Discontinued Medications





Fentanyl (Duragesic)  12 mcg TRDERM Q72H [facility]


   Last Admin: 02/05/21 08:41 Dose:  12 mcg


   Documented by: 


Fentanyl (Fentanyl)  12.5 mcg IVPUSH Q6H PRN


   PRN Reason: Breakthrough Pain


   Last Admin: 02/05/21 08:35 Dose:  12.5 mcg


   Documented by: 


Fentanyl (Duragesic)  12 mcg TRDERM Q72H [facility]


   Last Admin: 02/04/21 22:24 Dose:  Not Given


   Documented by: 


Fentanyl (Fentanyl)  12.5 - 25 mcg IVPUSH Q6H PRN


   PRN Reason: Breakthrough Pain


   Last Admin: 02/07/21 15:15 Dose:  25 mcg


   Documented by: 











- Exam


General: Reports: Alert, Oriented, Lethargic


Neck: Reports: Supple


Lungs: Reports: Clear to Auscultation, Normal Respiratory Effort


Cardiovascular: Reports: Regular Rate, Regular Rhythm


GI/Abdominal Exam: Normal Bowel Sounds, Soft, Non-Tender


Extremities: Pedal Edema (trace)


Skin: Reports: Warm, Dry, Intact


Neurological: Reports: Normal Speech, Normal Tone.  Denies: Strength Equal 

Bilateral


Psy/Mental Status: Reports: Alert, Normal Mood